# Patient Record
Sex: MALE | Race: WHITE | NOT HISPANIC OR LATINO | Employment: FULL TIME | URBAN - METROPOLITAN AREA
[De-identification: names, ages, dates, MRNs, and addresses within clinical notes are randomized per-mention and may not be internally consistent; named-entity substitution may affect disease eponyms.]

---

## 2020-11-13 ENCOUNTER — OFFICE VISIT (OUTPATIENT)
Dept: GASTROENTEROLOGY | Facility: CLINIC | Age: 55
End: 2020-11-13
Payer: OTHER GOVERNMENT

## 2020-11-13 ENCOUNTER — TELEPHONE (OUTPATIENT)
Dept: GASTROENTEROLOGY | Facility: CLINIC | Age: 55
End: 2020-11-13

## 2020-11-13 VITALS
HEART RATE: 76 BPM | WEIGHT: 187.81 LBS | OXYGEN SATURATION: 98 % | DIASTOLIC BLOOD PRESSURE: 82 MMHG | HEIGHT: 66 IN | SYSTOLIC BLOOD PRESSURE: 120 MMHG | BODY MASS INDEX: 30.18 KG/M2

## 2020-11-13 DIAGNOSIS — R19.5 POSITIVE FIT (FECAL IMMUNOCHEMICAL TEST): Primary | ICD-10-CM

## 2020-11-13 PROCEDURE — 99999 PR PBB SHADOW E&M-NEW PATIENT-LVL III: CPT | Mod: PBBFAC,,, | Performed by: NURSE PRACTITIONER

## 2020-11-13 PROCEDURE — 99203 OFFICE O/P NEW LOW 30 MIN: CPT | Mod: PBBFAC | Performed by: NURSE PRACTITIONER

## 2020-11-13 PROCEDURE — 99999 PR PBB SHADOW E&M-NEW PATIENT-LVL III: ICD-10-PCS | Mod: PBBFAC,,, | Performed by: NURSE PRACTITIONER

## 2020-11-13 PROCEDURE — 99203 OFFICE O/P NEW LOW 30 MIN: CPT | Mod: S$PBB,,, | Performed by: NURSE PRACTITIONER

## 2020-11-13 PROCEDURE — 99203 PR OFFICE/OUTPT VISIT, NEW, LEVL III, 30-44 MIN: ICD-10-PCS | Mod: S$PBB,,, | Performed by: NURSE PRACTITIONER

## 2020-11-13 RX ORDER — POLYETHYLENE GLYCOL 3350, SODIUM SULFATE ANHYDROUS, SODIUM BICARBONATE, SODIUM CHLORIDE, POTASSIUM CHLORIDE 236; 22.74; 6.74; 5.86; 2.97 G/4L; G/4L; G/4L; G/4L; G/4L
4 POWDER, FOR SOLUTION ORAL ONCE
Qty: 4000 ML | Refills: 0 | Status: SHIPPED | OUTPATIENT
Start: 2020-11-13 | End: 2020-11-13

## 2020-11-13 NOTE — TELEPHONE ENCOUNTER
"ENDO screening    1. Have you been admitted for cardiac, kidney or pulmonary causes to the hospital in the past 3 months? no   If yes, schedule an appointment with PCP before scheduling endoscopic procedure.     2. Have you had a stent placed in the last 12 months? no   If yes, for a screening visit, cancel and message the ordering provider.  The patient will need a new order when the time is appropriate.     3. Have you had a stroke or heart attack in the past 6 months? no   If yes, cancel and refer patient to ordering provider for clearance, also message ordering provider to inform.     4. Have you had any chest pain in the past 3 months? no   If yes, Have you been evaluated by your PCP and/or cardiologist and it was determined to not be heart related? not applicable   If No, Pt needs to be seen by PCP or Cardiologist .  Pt can be scheduled once clearance obtained by either of those providers.     5. Do you take prescription weight loss medications?  no   If yes, must stop for 2 weeks prior to procedure.     6. Have you been diagnosed with diverticulitis within the past 3 months? no   If yes, must have been seen by GI within the last 3 months, if not schedule with GI LUCINDA.    If pt has been seen by GI, schedule procedure 8-12 weeks post antibiotic treatment.     7. Are you on Dialysis? no  If yes, schedule procedure for the day AFTER dialysis.  Appt time should be 9am or later, patient arrival time is 2 hours prior.  Nulytely or miralax prep for all patients with kidney disease.     8. Are you diabetic?  no   If yes, schedule morning appt. Advise pt to hold all diabetic meds day of procedure.     9. If pt is older than 80 years of age and HAS NOT been seen by GI or PCP within the last 6 months, needs appt with GI LUCINDA.   If pt has been seen by the GI provider or PCP within the past 6  months AND meets criteria, schedule procedure AND send message to the endoscopist.     10. Is patient on a "high risk" medication " (blood thinner/antiplatelet agent)?  no   If yes, has cardiac clearance been obtained within the last 60 days? N/A   If no, a new clearance needs to be obtained.     Final Questions:    1.I have reviewed the last colonoscopy for recommendations regarding next procedure bowel prep.  yes  2. I have reviewed medications and allergies.  yes  3. I have verified the pharmacy information and appropriate prep sent if needed. yes  4. Prep instructions have been mailed or sent to portal per patient request. yes        All schedulers will have ability to reach out to the ordering GI provider to clarify any issues.

## 2020-11-13 NOTE — PROGRESS NOTES
"Clinic Consult:  [facility] Consultation Note    Reason for Consult:  The encounter diagnosis was Positive FIT (fecal immunochemical test).    PCP: Primary Doctor No   No address on file    HPI:  This is a 54 y.o. male here for evaluation of colon cancer screening.   He had colon cancer screening with FIT kit that was +ve.   No previous colonoscopy. No family history of colon cancer. No overt GI bleeding. No abdominal pain, hematochezia, melena, nausea, vomiting, or weight loss.      Review of Systems   Constitutional: Negative for fever, malaise/fatigue and weight loss.   HENT: Negative for sore throat.    Respiratory: Negative for cough and wheezing.    Cardiovascular: Negative for chest pain and palpitations.   Gastrointestinal: Negative for abdominal pain, blood in stool, constipation, diarrhea, heartburn, melena, nausea and vomiting.   Genitourinary: Negative for dysuria and frequency.   Musculoskeletal: Negative for back pain, joint pain, myalgias and neck pain.   Skin: Negative for itching and rash.   Neurological: Negative for dizziness, speech change, seizures, loss of consciousness and headaches.   Psychiatric/Behavioral: Negative for depression and substance abuse. The patient is not nervous/anxious.        Medical History:  has no past medical history on file.    Surgical History:  has a past surgical history that includes Thumb arthroscopy and Appendectomy.    Family History: family history includes Diabetes in his paternal grandmother; Heart disease in his mother; Hyperlipidemia in his mother..     Social History:      Allergies: Reviewed    Home Medications:   Current Outpatient Medications on File Prior to Visit   Medication Sig Dispense Refill    multivitamin capsule Take 1 capsule by mouth once daily.       No current facility-administered medications on file prior to visit.        Physical Exam:  /82   Pulse 76   Ht 5' 6" (1.676 m)   Wt 85.2 kg (187 lb 13.3 oz)   SpO2 98%  "  BMI 30.32 kg/m²   Body mass index is 30.32 kg/m².  Physical Exam   Constitutional: He is oriented to person, place, and time and well-developed, well-nourished, and in no distress. No distress.   HENT:   Head: Normocephalic.   Eyes: Pupils are equal, round, and reactive to light. Conjunctivae are normal.   Cardiovascular: Normal rate, regular rhythm and normal heart sounds.   Pulmonary/Chest: Effort normal and breath sounds normal. No respiratory distress.   Abdominal: Soft. Bowel sounds are normal. He exhibits no distension. There is no abdominal tenderness.   Neurological: He is alert and oriented to person, place, and time. No cranial nerve deficit.   Skin: Skin is warm and dry. No rash noted.   Psychiatric: Mood and affect normal.       Labs: Pertinent labs reviewed.    Assessment:  1. Positive FIT (fecal immunochemical test)        Recommendations:   Needs colonoscopy for +ve FIT    Positive FIT (fecal immunochemical test)  -     Case request GI: COLONOSCOPY  -     polyethylene glycol (GOLYTELY,NULYTELY) 236-22.74-6.74 -5.86 gram suspension; Take 4,000 mLs (4 L total) by mouth once. for 1 dose  Dispense: 4000 mL; Refill: 0  -     COVID-19 Routine Screening; Future; Expected date: 11/13/2020    Follow up to be determined after results/ procedure(s).    Thank you so much for allowing me to participate in the care of JACK Funk

## 2020-12-09 ENCOUNTER — TELEPHONE (OUTPATIENT)
Dept: GASTROENTEROLOGY | Facility: CLINIC | Age: 55
End: 2020-12-09

## 2020-12-09 NOTE — TELEPHONE ENCOUNTER
Attempted to contact patient to reschedule appointment for Pre Procedural testing. No answer, left message to return call.

## 2020-12-09 NOTE — TELEPHONE ENCOUNTER
----- Message from Jes Mccarthy LPN sent at 12/9/2020 12:54 PM CST -----  Regarding: pre-procedural testing  Good afternoon,    Effective 12/15, the [facility] will no longer be performing pre-procedural covid swabs Monday-Saturday. This patient is scheduled for 1/5. Please contact the patient and re-schedule at another location.     Thank you,  Jes

## 2020-12-15 ENCOUNTER — TELEPHONE (OUTPATIENT)
Dept: GASTROENTEROLOGY | Facility: CLINIC | Age: 55
End: 2020-12-15

## 2020-12-15 NOTE — TELEPHONE ENCOUNTER
Attempted to contact patient to notify him that the location of his COVID test, had to be changed. I have placed in the mail the new location with the date and time of his COVID test.

## 2021-01-13 ENCOUNTER — TELEPHONE (OUTPATIENT)
Dept: PREADMISSION TESTING | Facility: HOSPITAL | Age: 56
End: 2021-01-13

## 2021-01-14 ENCOUNTER — TELEPHONE (OUTPATIENT)
Dept: ENDOSCOPY | Facility: HOSPITAL | Age: 56
End: 2021-01-14

## 2021-01-19 ENCOUNTER — LAB VISIT (OUTPATIENT)
Dept: OTOLARYNGOLOGY | Facility: CLINIC | Age: 56
End: 2021-01-19
Payer: OTHER GOVERNMENT

## 2021-01-19 DIAGNOSIS — R19.5 POSITIVE FIT (FECAL IMMUNOCHEMICAL TEST): ICD-10-CM

## 2021-01-19 PROCEDURE — U0003 INFECTIOUS AGENT DETECTION BY NUCLEIC ACID (DNA OR RNA); SEVERE ACUTE RESPIRATORY SYNDROME CORONAVIRUS 2 (SARS-COV-2) (CORONAVIRUS DISEASE [COVID-19]), AMPLIFIED PROBE TECHNIQUE, MAKING USE OF HIGH THROUGHPUT TECHNOLOGIES AS DESCRIBED BY CMS-2020-01-R: HCPCS

## 2021-01-20 ENCOUNTER — ANESTHESIA EVENT (OUTPATIENT)
Dept: ENDOSCOPY | Facility: HOSPITAL | Age: 56
End: 2021-01-20
Payer: OTHER GOVERNMENT

## 2021-01-20 LAB — SARS-COV-2 RNA RESP QL NAA+PROBE: NOT DETECTED

## 2021-01-21 PROBLEM — R19.5 POSITIVE FIT (FECAL IMMUNOCHEMICAL TEST): Status: ACTIVE | Noted: 2021-01-21

## 2021-01-22 ENCOUNTER — HOSPITAL ENCOUNTER (OUTPATIENT)
Facility: HOSPITAL | Age: 56
Discharge: HOME | End: 2021-01-22
Attending: INTERNAL MEDICINE | Admitting: INTERNAL MEDICINE
Payer: OTHER GOVERNMENT

## 2021-01-22 ENCOUNTER — ANESTHESIA (OUTPATIENT)
Dept: ENDOSCOPY | Facility: HOSPITAL | Age: 56
End: 2021-01-22
Payer: OTHER GOVERNMENT

## 2021-01-22 VITALS
BODY MASS INDEX: 28.77 KG/M2 | WEIGHT: 179 LBS | DIASTOLIC BLOOD PRESSURE: 76 MMHG | OXYGEN SATURATION: 97 % | SYSTOLIC BLOOD PRESSURE: 118 MMHG | RESPIRATION RATE: 19 BRPM | TEMPERATURE: 98 F | HEART RATE: 65 BPM | HEIGHT: 66 IN

## 2021-01-22 DIAGNOSIS — R19.5 POSITIVE FIT (FECAL IMMUNOCHEMICAL TEST): Primary | ICD-10-CM

## 2021-01-22 PROCEDURE — 27201012 HC FORCEPS, HOT/COLD, DISP: Performed by: INTERNAL MEDICINE

## 2021-01-22 PROCEDURE — 45380 PR COLONOSCOPY,BIOPSY: ICD-10-PCS | Mod: 59,,, | Performed by: INTERNAL MEDICINE

## 2021-01-22 PROCEDURE — 88305 TISSUE EXAM BY PATHOLOGIST: ICD-10-PCS | Mod: 26,,, | Performed by: PATHOLOGY

## 2021-01-22 PROCEDURE — 88305 TISSUE EXAM BY PATHOLOGIST: CPT | Mod: 26,,, | Performed by: PATHOLOGY

## 2021-01-22 PROCEDURE — 45385 COLONOSCOPY W/LESION REMOVAL: CPT | Performed by: INTERNAL MEDICINE

## 2021-01-22 PROCEDURE — 00811 ANES LWR INTST NDSC NOS: CPT | Performed by: INTERNAL MEDICINE

## 2021-01-22 PROCEDURE — 45380 COLONOSCOPY AND BIOPSY: CPT | Mod: 59,,, | Performed by: INTERNAL MEDICINE

## 2021-01-22 PROCEDURE — 45385 COLONOSCOPY W/LESION REMOVAL: CPT | Mod: ,,, | Performed by: INTERNAL MEDICINE

## 2021-01-22 PROCEDURE — 45385 PR COLONOSCOPY,REMV LESN,SNARE: ICD-10-PCS | Mod: ,,, | Performed by: INTERNAL MEDICINE

## 2021-01-22 PROCEDURE — 63600175 PHARM REV CODE 636 W HCPCS: Performed by: NURSE ANESTHETIST, CERTIFIED REGISTERED

## 2021-01-22 PROCEDURE — 25000003 PHARM REV CODE 250: Performed by: NURSE ANESTHETIST, CERTIFIED REGISTERED

## 2021-01-22 PROCEDURE — D9220A PRA ANESTHESIA: ICD-10-PCS | Mod: ,,, | Performed by: ANESTHESIOLOGY

## 2021-01-22 PROCEDURE — 27201089 HC SNARE, DISP (ANY): Performed by: INTERNAL MEDICINE

## 2021-01-22 PROCEDURE — 45380 COLONOSCOPY AND BIOPSY: CPT | Performed by: INTERNAL MEDICINE

## 2021-01-22 PROCEDURE — 37000008 HC ANESTHESIA 1ST 15 MINUTES: Performed by: INTERNAL MEDICINE

## 2021-01-22 PROCEDURE — D9220A PRA ANESTHESIA: Mod: ,,, | Performed by: ANESTHESIOLOGY

## 2021-01-22 PROCEDURE — 88305 TISSUE EXAM BY PATHOLOGIST: CPT | Performed by: PATHOLOGY

## 2021-01-22 PROCEDURE — 37000009 HC ANESTHESIA EA ADD 15 MINS: Performed by: INTERNAL MEDICINE

## 2021-01-22 PROCEDURE — 63600175 PHARM REV CODE 636 W HCPCS: Performed by: INTERNAL MEDICINE

## 2021-01-22 RX ORDER — LIDOCAINE HYDROCHLORIDE 20 MG/ML
INJECTION INTRAVENOUS
Status: DISCONTINUED | OUTPATIENT
Start: 2021-01-22 | End: 2021-01-22

## 2021-01-22 RX ORDER — SODIUM CHLORIDE, SODIUM LACTATE, POTASSIUM CHLORIDE, CALCIUM CHLORIDE 600; 310; 30; 20 MG/100ML; MG/100ML; MG/100ML; MG/100ML
INJECTION, SOLUTION INTRAVENOUS CONTINUOUS
Status: DISCONTINUED | OUTPATIENT
Start: 2021-01-22 | End: 2021-01-22 | Stop reason: HOSPADM

## 2021-01-22 RX ORDER — PROPOFOL 10 MG/ML
VIAL (ML) INTRAVENOUS
Status: DISCONTINUED | OUTPATIENT
Start: 2021-01-22 | End: 2021-01-22

## 2021-01-22 RX ORDER — SODIUM CHLORIDE 0.9 % (FLUSH) 0.9 %
10 SYRINGE (ML) INJECTION
Status: DISCONTINUED | OUTPATIENT
Start: 2021-01-22 | End: 2021-01-22 | Stop reason: HOSPADM

## 2021-01-22 RX ADMIN — PROPOFOL 40 MG: 10 INJECTION, EMULSION INTRAVENOUS at 10:01

## 2021-01-22 RX ADMIN — PROPOFOL 30 MG: 10 INJECTION, EMULSION INTRAVENOUS at 10:01

## 2021-01-22 RX ADMIN — Medication 80 MG: at 10:01

## 2021-01-22 RX ADMIN — PROPOFOL 90 MG: 10 INJECTION, EMULSION INTRAVENOUS at 10:01

## 2021-01-22 RX ADMIN — SODIUM CHLORIDE, SODIUM LACTATE, POTASSIUM CHLORIDE, AND CALCIUM CHLORIDE: 600; 310; 30; 20 INJECTION, SOLUTION INTRAVENOUS at 10:01

## 2021-01-29 LAB
COMMENT: NORMAL
FINAL PATHOLOGIC DIAGNOSIS: NORMAL
GROSS: NORMAL
Lab: NORMAL

## 2021-02-05 ENCOUNTER — PATIENT MESSAGE (OUTPATIENT)
Dept: GASTROENTEROLOGY | Facility: CLINIC | Age: 56
End: 2021-02-05

## 2021-04-23 ENCOUNTER — PATIENT MESSAGE (OUTPATIENT)
Dept: GASTROENTEROLOGY | Facility: CLINIC | Age: 56
End: 2021-04-23

## 2021-04-27 DIAGNOSIS — K64.8 INTERNAL HEMORRHOID: Primary | ICD-10-CM

## 2021-04-28 ENCOUNTER — PATIENT MESSAGE (OUTPATIENT)
Dept: RESEARCH | Facility: HOSPITAL | Age: 56
End: 2021-04-28

## 2021-04-29 ENCOUNTER — TELEPHONE (OUTPATIENT)
Dept: SURGERY | Facility: CLINIC | Age: 56
End: 2021-04-29

## 2021-06-07 ENCOUNTER — PATIENT MESSAGE (OUTPATIENT)
Dept: SURGERY | Facility: CLINIC | Age: 56
End: 2021-06-07

## 2021-06-29 ENCOUNTER — PATIENT MESSAGE (OUTPATIENT)
Dept: SURGERY | Facility: CLINIC | Age: 56
End: 2021-06-29

## 2021-07-14 ENCOUNTER — OFFICE VISIT (OUTPATIENT)
Dept: SURGERY | Facility: CLINIC | Age: 56
End: 2021-07-14
Payer: OTHER GOVERNMENT

## 2021-07-14 VITALS
SYSTOLIC BLOOD PRESSURE: 125 MMHG | HEART RATE: 63 BPM | BODY MASS INDEX: 30.07 KG/M2 | WEIGHT: 186.31 LBS | TEMPERATURE: 98 F | DIASTOLIC BLOOD PRESSURE: 79 MMHG

## 2021-07-14 DIAGNOSIS — K64.8 INTERNAL HEMORRHOID: ICD-10-CM

## 2021-07-14 PROCEDURE — 99213 OFFICE O/P EST LOW 20 MIN: CPT | Mod: PBBFAC | Performed by: COLON & RECTAL SURGERY

## 2021-07-14 PROCEDURE — 99999 PR PBB SHADOW E&M-EST. PATIENT-LVL III: ICD-10-PCS | Mod: PBBFAC,,, | Performed by: COLON & RECTAL SURGERY

## 2021-07-14 PROCEDURE — 99203 PR OFFICE/OUTPT VISIT, NEW, LEVL III, 30-44 MIN: ICD-10-PCS | Mod: S$PBB,,, | Performed by: COLON & RECTAL SURGERY

## 2021-07-14 PROCEDURE — 99203 OFFICE O/P NEW LOW 30 MIN: CPT | Mod: S$PBB,,, | Performed by: COLON & RECTAL SURGERY

## 2021-07-14 PROCEDURE — 99999 PR PBB SHADOW E&M-EST. PATIENT-LVL III: CPT | Mod: PBBFAC,,, | Performed by: COLON & RECTAL SURGERY

## 2022-12-09 ENCOUNTER — OFFICE VISIT (OUTPATIENT)
Dept: SURGERY | Facility: CLINIC | Age: 57
End: 2022-12-09
Payer: OTHER GOVERNMENT

## 2022-12-09 VITALS
SYSTOLIC BLOOD PRESSURE: 119 MMHG | DIASTOLIC BLOOD PRESSURE: 75 MMHG | HEART RATE: 73 BPM | WEIGHT: 191.81 LBS | TEMPERATURE: 98 F | BODY MASS INDEX: 30.96 KG/M2

## 2022-12-09 DIAGNOSIS — K64.4 INTERNAL AND EXTERNAL BLEEDING HEMORRHOIDS: Primary | ICD-10-CM

## 2022-12-09 DIAGNOSIS — K64.8 INTERNAL AND EXTERNAL BLEEDING HEMORRHOIDS: Primary | ICD-10-CM

## 2022-12-09 PROCEDURE — 99999 PR PBB SHADOW E&M-EST. PATIENT-LVL IV: CPT | Mod: PBBFAC,,, | Performed by: SURGERY

## 2022-12-09 PROCEDURE — 99204 PR OFFICE/OUTPT VISIT, NEW, LEVL IV, 45-59 MIN: ICD-10-PCS | Mod: S$PBB,,, | Performed by: SURGERY

## 2022-12-09 PROCEDURE — 99999 PR PBB SHADOW E&M-EST. PATIENT-LVL IV: ICD-10-PCS | Mod: PBBFAC,,, | Performed by: SURGERY

## 2022-12-09 PROCEDURE — 99214 OFFICE O/P EST MOD 30 MIN: CPT | Mod: PBBFAC | Performed by: SURGERY

## 2022-12-09 PROCEDURE — 99204 OFFICE O/P NEW MOD 45 MIN: CPT | Mod: S$PBB,,, | Performed by: SURGERY

## 2022-12-09 RX ORDER — HYDROCORTISONE ACETATE 25 MG/1
25 SUPPOSITORY RECTAL 2 TIMES DAILY
Qty: 20 SUPPOSITORY | Refills: 0 | Status: SHIPPED | OUTPATIENT
Start: 2022-12-09 | End: 2022-12-19

## 2022-12-09 RX ORDER — LIDOCAINE HYDROCHLORIDE 10 MG/ML
1 INJECTION, SOLUTION EPIDURAL; INFILTRATION; INTRACAUDAL; PERINEURAL ONCE
Status: DISCONTINUED | OUTPATIENT
Start: 2022-12-09 | End: 2023-02-16 | Stop reason: HOSPADM

## 2022-12-09 NOTE — PROGRESS NOTES
[facility] -   General Surgery History & Physical    SUBJECTIVE:     History of Present Illness:  Patient is a 56 y.o. male presents with rectal bleeding from hemorrhoids. Underwent a colonoscopy and found to have internal hemorrhoids. Initially he was going to discuss having them banded, but they stopped bleeding. He states they are now bleeding quite a bit.      Review of patient's allergies indicates:  No Known Allergies    Current Outpatient Medications   Medication Sig Dispense Refill    multivitamin capsule Take 1 capsule by mouth once daily.      hydrocortisone (ANUSOL-HC) 25 mg suppository Place 1 suppository (25 mg total) rectally 2 (two) times daily. for 10 days 20 suppository 0     Current Facility-Administered Medications   Medication Dose Route Frequency Provider Last Rate Last Admin    LIDOcaine (PF) 10 mg/ml (1%) injection 10 mg  1 mL Intradermal Once Mansi Kapoor DO           Past Medical History:   Diagnosis Date    Digestive disorder      Past Surgical History:   Procedure Laterality Date    APPENDECTOMY      COLONOSCOPY N/A 1/22/2021    Procedure: COLONOSCOPY;  Surgeon: Kari Rivera MD;  Location: [facility];  Service: Endoscopy;  Laterality: N/A;    THUMB ARTHROSCOPY       Family History   Problem Relation Age of Onset    Heart disease Mother     Hyperlipidemia Mother     Diabetes Paternal Grandmother      Social History     Tobacco Use    Smoking status: Never    Smokeless tobacco: Never        Review of Systems:  Review of Systems   Constitutional:  Negative for fever.   Gastrointestinal:  Positive for anal bleeding.     OBJECTIVE:     Vital Signs (Most Recent)  Temp: 98.2 °F (36.8 °C) (12/09/22 1140)  Pulse: 73 (12/09/22 1140)  BP: 119/75 (12/09/22 1140)     87 kg (191 lb 12.8 oz)     Physical Exam:  Physical Exam  Vitals and nursing note reviewed.   Constitutional:       General: He is not in acute distress.     Appearance: He is not ill-appearing, toxic-appearing  or diaphoretic.   HENT:      Head: Normocephalic.      Nose: Nose normal.      Mouth/Throat:      Mouth: Mucous membranes are moist.   Eyes:      General: No scleral icterus.        Right eye: No discharge.         Left eye: No discharge.      Extraocular Movements: Extraocular movements intact.   Cardiovascular:      Rate and Rhythm: Normal rate and regular rhythm.   Pulmonary:      Effort: No respiratory distress.      Breath sounds: No stridor.   Abdominal:      General: There is no distension.      Palpations: Abdomen is soft.      Tenderness: There is no abdominal tenderness.   Genitourinary:     Comments: Moderately sized external hemorrhoid with thickened mucosa consistent with chronic irritation. Smaller external and visible internal hemorrhoids.  Musculoskeletal:      Cervical back: No rigidity.   Skin:     General: Skin is warm and dry.      Coloration: Skin is not jaundiced or pale.   Neurological:      Mental Status: He is alert and oriented to person, place, and time.   Psychiatric:         Mood and Affect: Mood normal.         Behavior: Behavior normal.     Dr. Jimenez's office note reviewed.    Colonoscopy:  Findings:        A 2 to 3 mm polyp was found in the transverse colon. The polyp was        sessile. The polyp was removed with a cold biopsy forceps. Resection        and retrieval were complete.        A 3 to 4 mm polyp was found in the transverse colon. The polyp was        sessile. The polyp was removed with a cold snare. Resection and        retrieval were complete.        Internal hemorrhoids were found during retroflexion. The hemorrhoids        were moderate.        The exam was otherwise without abnormality on direct and        retroflexion views.        Hemorrhoids were found on perianal exam.   Impression:           - One 2 to 3 mm polyp in the transverse colon,                         removed with a cold biopsy forceps. Resected and                         retrieved.                          - One 3 to 4 mm polyp in the transverse colon,                         removed with a cold snare. Resected and retrieved.                         - Internal hemorrhoids.                         - The examination was otherwise normal on direct                         and retroflexion views.                         - Hemorrhoids found on perianal exam.   Recommendation:       - Discharge patient to home (via wheelchair).                         - Resume previous diet.                         - Continue present medications.                         - Await pathology results.                         - Repeat colonoscopy in 7 years for surveillance.       Final Pathologic Diagnosis COLON, TRANSVERSE, BIOPSY:   - Sessile serrated adenomas with low-grade dysplasia (x2)   - No evidence of high-grade dysplasia or malignancy (multiple deeper levels   examined)        ASSESSMENT/PLAN:     Augustine was seen today for consult.    Diagnoses and all orders for this visit:    Internal and external bleeding hemorrhoids  -     Full code; Standing  -     Place in Outpatient; Standing  -     Case Request Operating Room: HEMORRHOIDECTOMY  -     Insert peripheral IV; Standing  -     Diet NPO; Standing  -     Place sequential compression device; Standing  -     Comprehensive metabolic panel; Future  -     CBC auto differential; Future  -     Full code  -     Insert peripheral IV    Other orders  -     LIDOcaine (PF) 10 mg/ml (1%) injection 10 mg  -     ceFAZolin (ANCEF) 2 g in dextrose 5 % 50 mL IVPB  -     hydrocortisone (ANUSOL-HC) 25 mg suppository; Place 1 suppository (25 mg total) rectally 2 (two) times daily. for 10 days         Will plan for hemorrhoidectomy at the patient's earliest convenience. Will prescribe anusol suppositories. We discussed the importance of avoiding constipation and straining.     After explaining the risks (including bleeding, infection, impaired wound healing, scarring, damage to other organs, vascular  complications, cardiopulmonary complications, and death), benefits, and alternatives, patient verbalized understanding and would like to proceed with surgery. All questions were answered to their satisfaction.      Patient expressed understanding and is in agreement.      Mansi aKpoor,   General Surgery  [facility]

## 2023-01-17 ENCOUNTER — PATIENT MESSAGE (OUTPATIENT)
Dept: SURGERY | Facility: HOSPITAL | Age: 58
End: 2023-01-17
Payer: OTHER GOVERNMENT

## 2023-01-17 RX ORDER — HYDROCORTISONE ACETATE 25 MG/1
25 SUPPOSITORY RECTAL 2 TIMES DAILY
Qty: 20 SUPPOSITORY | Refills: 0 | Status: SHIPPED | OUTPATIENT
Start: 2023-01-17 | End: 2023-01-24

## 2023-01-18 ENCOUNTER — TELEPHONE (OUTPATIENT)
Dept: SURGERY | Facility: CLINIC | Age: 58
End: 2023-01-18
Payer: OTHER GOVERNMENT

## 2023-01-18 NOTE — TELEPHONE ENCOUNTER
Spoke with Madelaine, patient's spouse, in regards to a call back. She gave [facility] fax number and an ATTN as well to proceed with surgery. Notified her that the department does not handle authorization/[facility] insurance specifically and that it would be with another department. Madelaine will call [facility] for further instructions. She verbalized understanding.

## 2023-01-18 NOTE — TELEPHONE ENCOUNTER
----- Message from Katie Gabriel sent at 1/18/2023 12:34 PM CST -----  Contact: Madelaine Burkett stated the fax number for the [facility] is [phone]. It needs to have attention Dr. Rod, which is his PCP. Please call her back with any issues or any questions at [phone].

## 2023-01-18 NOTE — TELEPHONE ENCOUNTER
Called Madelaine again. Gave her authorization department phone number at [facility]: Jaquan Orozco [phone]. She verbalized understanding.

## 2023-01-19 ENCOUNTER — TELEPHONE (OUTPATIENT)
Dept: SURGERY | Facility: CLINIC | Age: 58
End: 2023-01-19
Payer: OTHER GOVERNMENT

## 2023-01-19 NOTE — TELEPHONE ENCOUNTER
Returned patient's spouse callback. Madelaine would like a [facility] form to be filled out for patient in regards to him having surgery. She e-mailed form and would like it to be completed as soon as possible. She wants form to state patient is scheduled to have surgery. She states she needs this filled out for patient or he will be unable to have surgery. Notified her we will review form with Dr. Kapoor as some forms can only be filled out after surgery. She verbalized understanding.

## 2023-01-24 RX ORDER — HYDROCORTISONE ACETATE 25 MG/1
25 SUPPOSITORY RECTAL 2 TIMES DAILY
Qty: 20 SUPPOSITORY | Refills: 0 | OUTPATIENT
Start: 2023-01-24 | End: 2023-02-03

## 2023-02-03 ENCOUNTER — LAB VISIT (OUTPATIENT)
Dept: LAB | Facility: HOSPITAL | Age: 58
End: 2023-02-03
Attending: SURGERY
Payer: OTHER GOVERNMENT

## 2023-02-03 DIAGNOSIS — K64.8 INTERNAL AND EXTERNAL BLEEDING HEMORRHOIDS: ICD-10-CM

## 2023-02-03 DIAGNOSIS — K64.4 INTERNAL AND EXTERNAL BLEEDING HEMORRHOIDS: ICD-10-CM

## 2023-02-03 LAB
ALBUMIN SERPL BCP-MCNC: 4.1 G/DL (ref 3.5–5.2)
ALP SERPL-CCNC: 62 U/L (ref 55–135)
ALT SERPL W/O P-5'-P-CCNC: 42 U/L (ref 10–44)
ANION GAP SERPL CALC-SCNC: 8 MMOL/L (ref 8–16)
AST SERPL-CCNC: 24 U/L (ref 10–40)
BASOPHILS # BLD AUTO: 0.05 K/UL (ref 0–0.2)
BASOPHILS NFR BLD: 0.8 % (ref 0–1.9)
BILIRUB SERPL-MCNC: 0.6 MG/DL (ref 0.1–1)
BUN SERPL-MCNC: 17 MG/DL (ref 6–20)
CALCIUM SERPL-MCNC: 9.7 MG/DL (ref 8.7–10.5)
CHLORIDE SERPL-SCNC: 103 MMOL/L (ref 95–110)
CO2 SERPL-SCNC: 28 MMOL/L (ref 23–29)
CREAT SERPL-MCNC: 1.2 MG/DL (ref 0.5–1.4)
DIFFERENTIAL METHOD: ABNORMAL
EOSINOPHIL # BLD AUTO: 0.2 K/UL (ref 0–0.5)
EOSINOPHIL NFR BLD: 3.9 % (ref 0–8)
ERYTHROCYTE [DISTWIDTH] IN BLOOD BY AUTOMATED COUNT: 12 % (ref 11.5–14.5)
EST. GFR  (NO RACE VARIABLE): >60 ML/MIN/1.73 M^2
GLUCOSE SERPL-MCNC: 100 MG/DL (ref 70–110)
HCT VFR BLD AUTO: 42.4 % (ref 40–54)
HGB BLD-MCNC: 14.6 G/DL (ref 14–18)
IMM GRANULOCYTES # BLD AUTO: 0.04 K/UL (ref 0–0.04)
IMM GRANULOCYTES NFR BLD AUTO: 0.6 % (ref 0–0.5)
LYMPHOCYTES # BLD AUTO: 1.3 K/UL (ref 1–4.8)
LYMPHOCYTES NFR BLD: 20.8 % (ref 18–48)
MCH RBC QN AUTO: 30.5 PG (ref 27–31)
MCHC RBC AUTO-ENTMCNC: 34.4 G/DL (ref 32–36)
MCV RBC AUTO: 89 FL (ref 82–98)
MONOCYTES # BLD AUTO: 0.7 K/UL (ref 0.3–1)
MONOCYTES NFR BLD: 10.8 % (ref 4–15)
NEUTROPHILS # BLD AUTO: 3.9 K/UL (ref 1.8–7.7)
NEUTROPHILS NFR BLD: 63.1 % (ref 38–73)
NRBC BLD-RTO: 0 /100 WBC
PLATELET # BLD AUTO: 238 K/UL (ref 150–450)
PMV BLD AUTO: 9.9 FL (ref 9.2–12.9)
POTASSIUM SERPL-SCNC: 5 MMOL/L (ref 3.5–5.1)
PROT SERPL-MCNC: 7.2 G/DL (ref 6–8.4)
RBC # BLD AUTO: 4.78 M/UL (ref 4.6–6.2)
SODIUM SERPL-SCNC: 139 MMOL/L (ref 136–145)
WBC # BLD AUTO: 6.21 K/UL (ref 3.9–12.7)

## 2023-02-03 PROCEDURE — 36415 COLL VENOUS BLD VENIPUNCTURE: CPT | Performed by: SURGERY

## 2023-02-03 PROCEDURE — 80053 COMPREHEN METABOLIC PANEL: CPT | Performed by: SURGERY

## 2023-02-03 PROCEDURE — 85025 COMPLETE CBC W/AUTO DIFF WBC: CPT | Performed by: SURGERY

## 2023-02-13 ENCOUNTER — TELEPHONE (OUTPATIENT)
Dept: PREADMISSION TESTING | Facility: HOSPITAL | Age: 58
End: 2023-02-13
Payer: OTHER GOVERNMENT

## 2023-02-13 NOTE — TELEPHONE ENCOUNTER
Pre op instructions reviewed with pt wife : Spoke about pre op process and surgery instructions, verbalized understanding.    To confirm, Surgery is scheduled on 2/16/23. We will call you late afternoon the business day prior to surgery with your arrival time.    *Please report to the [facility] Lobby (1st Floor) located off of [facility] (2nd Entrance/Building on the left, in front of the flag pole).  Address: [address] [place] [address] [place] [address]      INSTRUCTIONS IMPORTANT!!!  Do Not Eat, Drink, or Smoke after 12 midnight unless instructed otherwise by your Surgeon. OK to brush teeth, no gum, candy or mints!      *Take Only these medications with a small sip of water Morning of Surgery:  none    ____  HOLD all vitamins, herbal supplements, aspirin products & NSAIDS 7 days prior to surgery, as these can thin the blood.  ____  NO Acrylic/fake nails or nail polish worn day of surgery (specifically hand/arm & foot surgeries).  ____  NO powder, lotions, deodorants, oils or cream on body.  ____  Remove all jewelry & piercings prior to surgery.  ____  Remove Dentures, Hearing Aids & Contact Lens prior to surgery.  ____  Bring photo ID and insurance information to hospital (Leave Valuables at Home).  ____  If going home the same day, arrange for a ride home. You will not be able to drive for 24 hrs if Anesthesia was used.   ____  Females (ages 11-60): may need to give a urine sample the morning of surgery; please see Pre op Nurse prior to using the restroom.  ____  Males: Stop ED medications (Viagra, Cialis) 24 hrs prior to surgery.  ____  Wear clean, loose fitting clothing to allow for dressings/ bandages.            Diabetic Patients: If you take diabetic medication, do NOT take morning of surgery unless instructed by Doctor. Metformin to be stopped 24 hrs prior to surgery time. DO NOT take long-acting insulin the evening before surgery. Blood sugars will be checked in pre-op by Nurse.    Bathing  Instructions:    -Shower with anti-bacterial Soap (Hibiclens or Dial) the night before surgery and the morning of.   -Do not use Hibiclens on your face or genitals.   -Apply clean clothes after shower.  -Do not shave your face or body 2 days prior to surgery unless instructed otherwise by your Surgeon.  -Do not shave pubic hair 7 days prior to surgery (gyn pt's).    [facility] Visitor/Ride Policy:  Only 2 adults allowed (over the age of 18) to accompany you to the Hospital. You Must have a ride home from a responsible adult that you know and trust. Medical Transport, Uber or Lyft can only be used if patient has a responsible adult to accompany them during ride home.    Discharge Instructions: You will receive Post-op/Discharge instructions by your Discharge Nurse prior to going home. Please call your Surgeon's office with any post-surgery questions/concerns @ [phone].    *If you are running late or have questions the morning of surgery, please call the Surgery Dept @ [phone]  *Insurance/ Financial Questions, please call [phone]    Thank you,  -[facility] Pre Admit Testing Nurse  [phone] or [phone]  M-F 7:30 am-4 pm (Closed Major Holidays)

## 2023-02-15 ENCOUNTER — ANESTHESIA EVENT (OUTPATIENT)
Dept: SURGERY | Facility: HOSPITAL | Age: 58
End: 2023-02-15
Payer: OTHER GOVERNMENT

## 2023-02-15 ENCOUNTER — TELEPHONE (OUTPATIENT)
Dept: PREADMISSION TESTING | Facility: HOSPITAL | Age: 58
End: 2023-02-15
Payer: OTHER GOVERNMENT

## 2023-02-15 NOTE — ANESTHESIA PREPROCEDURE EVALUATION
02/15/2023  Augustine Robledo is a 57 y.o., male     Patient Active Problem List   Diagnosis    Positive FIT (fecal immunochemical test)     Past Medical History:   Diagnosis Date    Digestive disorder      Past Surgical History:   Procedure Laterality Date    APPENDECTOMY      COLONOSCOPY N/A 1/22/2021    Procedure: COLONOSCOPY;  Surgeon: Kari Rivera MD;  Location: [facility];  Service: Endoscopy;  Laterality: N/A;    THUMB ARTHROSCOPY           Chemistry        Component Value Date/Time     02/03/2023 0953    K 5.0 02/03/2023 0953     02/03/2023 0953    CO2 28 02/03/2023 0953    BUN 17 02/03/2023 0953    CREATININE 1.2 02/03/2023 0953     02/03/2023 0953        Component Value Date/Time    CALCIUM 9.7 02/03/2023 0953    ALKPHOS 62 02/03/2023 0953    AST 24 02/03/2023 0953    ALT 42 02/03/2023 0953    BILITOT 0.6 02/03/2023 0953        Lab Results   Component Value Date    WBC 6.21 02/03/2023    HGB 14.6 02/03/2023    HCT 42.4 02/03/2023    MCV 89 02/03/2023     02/03/2023           Pre-op Assessment    I have reviewed the Patient Summary Reports.    I have reviewed the NPO Status.   I have reviewed the Medications.     Review of Systems  Anesthesia Hx:  History of prior surgery of interest to airway management or planning:   Social:  Non-Smoker    Hematology/Oncology:  Hematology Normal   Oncology Normal    -- Denies Anemia:    Cardiovascular:  Cardiovascular Normal     Pulmonary:   Sleep Apnea    Renal/:  Renal/ Normal     Hepatic/GI:   Bleeding internal hemorrhoids    Musculoskeletal:  Musculoskeletal Normal    Neurological:  Neurology Normal    Endocrine:   BMI 31 Obesity / BMI > 30      Physical Exam  General: Well nourished    Airway:  Mallampati: II   Mouth Opening: Normal  TM Distance: Normal  Neck ROM: Normal ROM    Dental:  Intact        Anesthesia Plan  Type  of Anesthesia, risks & benefits discussed:    Anesthesia Type: Gen ETT  Intra-op Monitoring Plan: Standard ASA Monitors  Post Op Pain Control Plan: multimodal analgesia and IV/PO Opioids PRN  Induction:  IV  Airway Plan: Direct, Post-Induction  Informed Consent: Informed consent signed with the Patient and all parties understand the risks and agree with anesthesia plan.  All questions answered.   ASA Score: 2  Day of Surgery Review of History & Physical: H&P Update referred to the surgeon/provider.    Ready For Surgery From Anesthesia Perspective.     .      Chemistry        Component Value Date/Time     02/03/2023 0953    K 5.0 02/03/2023 0953     02/03/2023 0953    CO2 28 02/03/2023 0953    BUN 17 02/03/2023 0953    CREATININE 1.2 02/03/2023 0953     02/03/2023 0953        Component Value Date/Time    CALCIUM 9.7 02/03/2023 0953    ALKPHOS 62 02/03/2023 0953    AST 24 02/03/2023 0953    ALT 42 02/03/2023 0953    BILITOT 0.6 02/03/2023 0953        Lab Results   Component Value Date    WBC 6.21 02/03/2023    HGB 14.6 02/03/2023    HCT 42.4 02/03/2023    MCV 89 02/03/2023     02/03/2023

## 2023-02-15 NOTE — TELEPHONE ENCOUNTER
Called and spoke with Pt's spouse about the following:     Please arrive to [facility] at 5:30 am on 2/16/23 for your scheduled procedure.  Address: [address] [place] [address] [place] [address] (2nd Building on left, 1st Floor Lobby)  >>>NO eating or drinking after midnight unless instructed otherwise by your Surgeon<<<

## 2023-02-16 ENCOUNTER — ANESTHESIA (OUTPATIENT)
Dept: SURGERY | Facility: HOSPITAL | Age: 58
End: 2023-02-16
Payer: OTHER GOVERNMENT

## 2023-02-16 ENCOUNTER — HOSPITAL ENCOUNTER (OUTPATIENT)
Facility: HOSPITAL | Age: 58
Discharge: HOME | End: 2023-02-16
Attending: SURGERY | Admitting: SURGERY
Payer: OTHER GOVERNMENT

## 2023-02-16 DIAGNOSIS — K64.8 INTERNAL AND EXTERNAL BLEEDING HEMORRHOIDS: ICD-10-CM

## 2023-02-16 DIAGNOSIS — K64.4 INTERNAL AND EXTERNAL BLEEDING HEMORRHOIDS: ICD-10-CM

## 2023-02-16 PROCEDURE — S0030 INJECTION, METRONIDAZOLE: HCPCS | Performed by: SURGERY

## 2023-02-16 PROCEDURE — 37000009 HC ANESTHESIA EA ADD 15 MINS: Performed by: SURGERY

## 2023-02-16 PROCEDURE — 00902 ANES ANORECTAL PX: CPT | Performed by: SURGERY

## 2023-02-16 PROCEDURE — 25000003 PHARM REV CODE 250: Performed by: NURSE ANESTHETIST, CERTIFIED REGISTERED

## 2023-02-16 PROCEDURE — 25000003 PHARM REV CODE 250: Performed by: SURGERY

## 2023-02-16 PROCEDURE — 46945 INT HRHC LIG 1 HROID W/O IMG: CPT | Mod: 51,,, | Performed by: SURGERY

## 2023-02-16 PROCEDURE — 88304 TISSUE EXAM BY PATHOLOGIST: CPT | Mod: 26,,, | Performed by: PATHOLOGY

## 2023-02-16 PROCEDURE — 46255 REMOVE INT/EXT HEM 1 GROUP: CPT | Mod: ,,, | Performed by: SURGERY

## 2023-02-16 PROCEDURE — 71000015 HC POSTOP RECOV 1ST HR: Performed by: SURGERY

## 2023-02-16 PROCEDURE — 27201423 OPTIME MED/SURG SUP & DEVICES STERILE SUPPLY: Performed by: SURGERY

## 2023-02-16 PROCEDURE — 46945 PR HEMORRHOIDECTOMY, INT, LIGATION, W/O IMG, SNGL: ICD-10-PCS | Mod: 51,,, | Performed by: SURGERY

## 2023-02-16 PROCEDURE — C9290 INJ, BUPIVACAINE LIPOSOME: HCPCS | Performed by: SURGERY

## 2023-02-16 PROCEDURE — 63600175 PHARM REV CODE 636 W HCPCS: Performed by: SURGERY

## 2023-02-16 PROCEDURE — 71000033 HC RECOVERY, INTIAL HOUR: Performed by: SURGERY

## 2023-02-16 PROCEDURE — 88304 TISSUE EXAM BY PATHOLOGIST: CPT | Performed by: PATHOLOGY

## 2023-02-16 PROCEDURE — 37000008 HC ANESTHESIA 1ST 15 MINUTES: Performed by: SURGERY

## 2023-02-16 PROCEDURE — 46255 PR HEMORRHOIDECTOMY,INT/EXT,1 COLUMN/GROUP: ICD-10-PCS | Mod: ,,, | Performed by: SURGERY

## 2023-02-16 PROCEDURE — 63600175 PHARM REV CODE 636 W HCPCS: Performed by: NURSE ANESTHETIST, CERTIFIED REGISTERED

## 2023-02-16 PROCEDURE — 36000707: Performed by: SURGERY

## 2023-02-16 PROCEDURE — 36000706: Performed by: SURGERY

## 2023-02-16 PROCEDURE — 88304 PR  SURG PATH,LEVEL III: ICD-10-PCS | Mod: 26,,, | Performed by: PATHOLOGY

## 2023-02-16 RX ORDER — IBUPROFEN 600 MG/1
600 TABLET ORAL EVERY 6 HOURS PRN
Qty: 25 TABLET | Refills: 0 | Status: SHIPPED | OUTPATIENT
Start: 2023-02-16 | End: 2023-02-24 | Stop reason: SDUPTHER

## 2023-02-16 RX ORDER — HYDROMORPHONE HYDROCHLORIDE 2 MG/ML
0.2 INJECTION, SOLUTION INTRAMUSCULAR; INTRAVENOUS; SUBCUTANEOUS EVERY 5 MIN PRN
Status: CANCELLED | OUTPATIENT
Start: 2023-02-16

## 2023-02-16 RX ORDER — DOCUSATE SODIUM 100 MG/1
100 CAPSULE, LIQUID FILLED ORAL 2 TIMES DAILY
Qty: 60 CAPSULE | Refills: 1 | Status: SHIPPED | OUTPATIENT
Start: 2023-02-16

## 2023-02-16 RX ORDER — DIPHENHYDRAMINE HYDROCHLORIDE 50 MG/ML
12.5 INJECTION INTRAMUSCULAR; INTRAVENOUS
Status: CANCELLED | OUTPATIENT
Start: 2023-02-16

## 2023-02-16 RX ORDER — FENTANYL CITRATE 50 UG/ML
INJECTION, SOLUTION INTRAMUSCULAR; INTRAVENOUS
Status: DISCONTINUED | OUTPATIENT
Start: 2023-02-16 | End: 2023-02-16

## 2023-02-16 RX ORDER — ONDANSETRON 2 MG/ML
INJECTION INTRAMUSCULAR; INTRAVENOUS
Status: DISCONTINUED | OUTPATIENT
Start: 2023-02-16 | End: 2023-02-16

## 2023-02-16 RX ORDER — OXYCODONE AND ACETAMINOPHEN 5; 325 MG/1; MG/1
1 TABLET ORAL
Status: CANCELLED | OUTPATIENT
Start: 2023-02-16

## 2023-02-16 RX ORDER — ACETAMINOPHEN 10 MG/ML
INJECTION, SOLUTION INTRAVENOUS
Status: DISCONTINUED | OUTPATIENT
Start: 2023-02-16 | End: 2023-02-16

## 2023-02-16 RX ORDER — METHOCARBAMOL 500 MG/1
1000 TABLET, FILM COATED ORAL 4 TIMES DAILY PRN
Qty: 35 TABLET | Refills: 0 | Status: SHIPPED | OUTPATIENT
Start: 2023-02-16 | End: 2023-02-20 | Stop reason: SDUPTHER

## 2023-02-16 RX ORDER — BUPIVACAINE HYDROCHLORIDE 2.5 MG/ML
INJECTION, SOLUTION EPIDURAL; INFILTRATION; INTRACAUDAL
Status: DISCONTINUED | OUTPATIENT
Start: 2023-02-16 | End: 2023-02-16 | Stop reason: HOSPADM

## 2023-02-16 RX ORDER — MIDAZOLAM HYDROCHLORIDE 1 MG/ML
INJECTION, SOLUTION INTRAMUSCULAR; INTRAVENOUS
Status: DISCONTINUED | OUTPATIENT
Start: 2023-02-16 | End: 2023-02-16

## 2023-02-16 RX ORDER — KETOROLAC TROMETHAMINE 30 MG/ML
15 INJECTION, SOLUTION INTRAMUSCULAR; INTRAVENOUS EVERY 8 HOURS PRN
Status: CANCELLED | OUTPATIENT
Start: 2023-02-16 | End: 2023-02-18

## 2023-02-16 RX ORDER — OXYCODONE AND ACETAMINOPHEN 7.5; 325 MG/1; MG/1
1 TABLET ORAL
Qty: 30 TABLET | Refills: 0 | Status: SHIPPED | OUTPATIENT
Start: 2023-02-16 | End: 2023-02-24 | Stop reason: SDUPTHER

## 2023-02-16 RX ORDER — CEFAZOLIN SODIUM 2 G/50ML
2 SOLUTION INTRAVENOUS
Status: COMPLETED | OUTPATIENT
Start: 2023-02-16 | End: 2023-02-16

## 2023-02-16 RX ORDER — METRONIDAZOLE 500 MG/100ML
500 INJECTION, SOLUTION INTRAVENOUS ONCE
Status: COMPLETED | OUTPATIENT
Start: 2023-02-16 | End: 2023-02-16

## 2023-02-16 RX ORDER — LIDOCAINE HYDROCHLORIDE 10 MG/ML
INJECTION, SOLUTION EPIDURAL; INFILTRATION; INTRACAUDAL; PERINEURAL
Status: DISCONTINUED | OUTPATIENT
Start: 2023-02-16 | End: 2023-02-16

## 2023-02-16 RX ORDER — ONDANSETRON 2 MG/ML
4 INJECTION INTRAMUSCULAR; INTRAVENOUS DAILY PRN
Status: CANCELLED | OUTPATIENT
Start: 2023-02-16

## 2023-02-16 RX ORDER — PROPOFOL 10 MG/ML
VIAL (ML) INTRAVENOUS
Status: DISCONTINUED | OUTPATIENT
Start: 2023-02-16 | End: 2023-02-16

## 2023-02-16 RX ADMIN — METRONIDAZOLE 500 MG: 5 INJECTION, SOLUTION INTRAVENOUS at 07:02

## 2023-02-16 RX ADMIN — MIDAZOLAM 2 MG: 1 INJECTION INTRAMUSCULAR; INTRAVENOUS at 06:02

## 2023-02-16 RX ADMIN — CEFAZOLIN SODIUM 2 G: 2 SOLUTION INTRAVENOUS at 07:02

## 2023-02-16 RX ADMIN — SODIUM CHLORIDE, SODIUM LACTATE, POTASSIUM CHLORIDE, AND CALCIUM CHLORIDE: .6; .31; .03; .02 INJECTION, SOLUTION INTRAVENOUS at 06:02

## 2023-02-16 RX ADMIN — PROPOFOL 150 MG: 10 INJECTION, EMULSION INTRAVENOUS at 07:02

## 2023-02-16 RX ADMIN — ONDANSETRON 4 MG: 2 INJECTION INTRAMUSCULAR; INTRAVENOUS at 07:02

## 2023-02-16 RX ADMIN — ACETAMINOPHEN 1000 MG: 10 INJECTION, SOLUTION INTRAVENOUS at 08:02

## 2023-02-16 RX ADMIN — FENTANYL CITRATE 100 MCG: 50 INJECTION, SOLUTION INTRAMUSCULAR; INTRAVENOUS at 07:02

## 2023-02-16 RX ADMIN — LIDOCAINE HYDROCHLORIDE 50 MG: 10 INJECTION, SOLUTION EPIDURAL; INFILTRATION; INTRACAUDAL; PERINEURAL at 07:02

## 2023-02-16 NOTE — ANESTHESIA PROCEDURE NOTES
Intubation    Date/Time: 2/16/2023 7:04 AM  Performed by: Rosalind Cervantes CRNA  Authorized by: Wallace Gardner II, MD     Intubation:     Induction:  Inhalational - mask    Intubated:  Postinduction    Mask Ventilation:  Not attempted    Attempts:  1    Attempted By:  CRNA    Difficult Airway Encountered?: No      Complications:  None    Airway Device:  Supraglottic airway/LMA    Airway Device Size:  4.0    Style/Cuff Inflation:  Cuffed (inflated to minimal occlusive pressure)    Placement Verified By:  Capnometry    Complicating Factors:  None    Findings Post-Intubation:  Atraumatic/condition of teeth unchanged

## 2023-02-16 NOTE — DISCHARGE SUMMARY
[facility] - Surgery (Hospital)  Discharge Note  Short Stay    Procedure(s) (LRB):  HEMORRHOIDECTOMY (N/A)  BLOCK, NERVE, PUDENDAL (N/A)      OUTCOME: Patient tolerated treatment/procedure well without complication and is now ready for discharge.    DISPOSITION: Home or Self Care    FINAL DIAGNOSIS:  <principal problem not specified>    FOLLOWUP: In clinic    DISCHARGE INSTRUCTIONS:  No discharge procedures on file.      Clinical Reference Documents Added to Patient Instructions         Document    DOCUSATE, ADULT (ENGLISH)    HEMORRHOIDECTOMY DISCHARGE INSTRUCTIONS (ENGLISH)    IBUPROFEN, ADULT (ENGLISH)    METHOCARBAMOL, ADULT (ENGLISH)    OXYCODONE AND ACETAMINOPHEN, ADULT (ENGLISH)            TIME SPENT ON DISCHARGE: 20 minutes

## 2023-02-16 NOTE — H&P
[facility] -   General Surgery History & Physical    SUBJECTIVE:     History of Present Illness:  Patient is a 56 y.o. male presents with rectal bleeding from hemorrhoids. Underwent a colonoscopy and found to have internal hemorrhoids. Initially he was going to discuss having them banded, but they stopped bleeding. He states they are now bleeding quite a bit.      Review of patient's allergies indicates:  No Known Allergies    Current Outpatient Medications   Medication Sig Dispense Refill    multivitamin capsule Take 1 capsule by mouth once daily.      hydrocortisone (ANUSOL-HC) 25 mg suppository Place 1 suppository (25 mg total) rectally 2 (two) times daily. for 10 days 20 suppository 0     Current Facility-Administered Medications   Medication Dose Route Frequency Provider Last Rate Last Admin    LIDOcaine (PF) 10 mg/ml (1%) injection 10 mg  1 mL Intradermal Once Mansi Kapoor DO           Past Medical History:   Diagnosis Date    Digestive disorder      Past Surgical History:   Procedure Laterality Date    APPENDECTOMY      COLONOSCOPY N/A 1/22/2021    Procedure: COLONOSCOPY;  Surgeon: Kari Rivera MD;  Location: [facility];  Service: Endoscopy;  Laterality: N/A;    THUMB ARTHROSCOPY       Family History   Problem Relation Age of Onset    Heart disease Mother     Hyperlipidemia Mother     Diabetes Paternal Grandmother      Social History     Tobacco Use    Smoking status: Never    Smokeless tobacco: Never        Review of Systems:  Review of Systems   Constitutional:  Negative for fever.   Gastrointestinal:  Positive for anal bleeding.     OBJECTIVE:     Vital Signs (Most Recent)  Temp: 98.2 °F (36.8 °C) (12/09/22 1140)  Pulse: 73 (12/09/22 1140)  BP: 119/75 (12/09/22 1140)     87 kg (191 lb 12.8 oz)     Physical Exam:  Physical Exam  Vitals and nursing note reviewed.   Constitutional:       General: He is not in acute distress.     Appearance: He is not ill-appearing, toxic-appearing  or diaphoretic.   HENT:      Head: Normocephalic.      Nose: Nose normal.      Mouth/Throat:      Mouth: Mucous membranes are moist.   Eyes:      General: No scleral icterus.        Right eye: No discharge.         Left eye: No discharge.      Extraocular Movements: Extraocular movements intact.   Cardiovascular:      Rate and Rhythm: Normal rate and regular rhythm.   Pulmonary:      Effort: No respiratory distress.      Breath sounds: No stridor.   Abdominal:      General: There is no distension.      Palpations: Abdomen is soft.      Tenderness: There is no abdominal tenderness.   Genitourinary:     Comments: Moderately sized external hemorrhoid with thickened mucosa consistent with chronic irritation. Smaller external and visible internal hemorrhoids.  Musculoskeletal:      Cervical back: No rigidity.   Skin:     General: Skin is warm and dry.      Coloration: Skin is not jaundiced or pale.   Neurological:      Mental Status: He is alert and oriented to person, place, and time.   Psychiatric:         Mood and Affect: Mood normal.         Behavior: Behavior normal.     Dr. Jimenez's office note reviewed.    Colonoscopy:  Findings:        A 2 to 3 mm polyp was found in the transverse colon. The polyp was        sessile. The polyp was removed with a cold biopsy forceps. Resection        and retrieval were complete.        A 3 to 4 mm polyp was found in the transverse colon. The polyp was        sessile. The polyp was removed with a cold snare. Resection and        retrieval were complete.        Internal hemorrhoids were found during retroflexion. The hemorrhoids        were moderate.        The exam was otherwise without abnormality on direct and        retroflexion views.        Hemorrhoids were found on perianal exam.   Impression:           - One 2 to 3 mm polyp in the transverse colon,                         removed with a cold biopsy forceps. Resected and                         retrieved.                          - One 3 to 4 mm polyp in the transverse colon,                         removed with a cold snare. Resected and retrieved.                         - Internal hemorrhoids.                         - The examination was otherwise normal on direct                         and retroflexion views.                         - Hemorrhoids found on perianal exam.   Recommendation:       - Discharge patient to home (via wheelchair).                         - Resume previous diet.                         - Continue present medications.                         - Await pathology results.                         - Repeat colonoscopy in 7 years for surveillance.       Final Pathologic Diagnosis COLON, TRANSVERSE, BIOPSY:   - Sessile serrated adenomas with low-grade dysplasia (x2)   - No evidence of high-grade dysplasia or malignancy (multiple deeper levels   examined)        ASSESSMENT/PLAN:     Augustine was seen today for consult.    Diagnoses and all orders for this visit:    Internal and external bleeding hemorrhoids  -     Full code; Standing  -     Place in Outpatient; Standing  -     Case Request Operating Room: HEMORRHOIDECTOMY  -     Insert peripheral IV; Standing  -     Diet NPO; Standing  -     Place sequential compression device; Standing  -     Comprehensive metabolic panel; Future  -     CBC auto differential; Future  -     Full code  -     Insert peripheral IV    Other orders  -     LIDOcaine (PF) 10 mg/ml (1%) injection 10 mg  -     ceFAZolin (ANCEF) 2 g in dextrose 5 % 50 mL IVPB  -     hydrocortisone (ANUSOL-HC) 25 mg suppository; Place 1 suppository (25 mg total) rectally 2 (two) times daily. for 10 days         Will plan for hemorrhoidectomy at the patient's earliest convenience. Will prescribe anusol suppositories. We discussed the importance of avoiding constipation and straining.     After explaining the risks (including bleeding, infection, impaired wound healing, scarring, damage to other organs, vascular  complications, cardiopulmonary complications, and death), benefits, and alternatives, patient verbalized understanding and would like to proceed with surgery. All questions were answered to their satisfaction.      Patient expressed understanding and is in agreement.      Mansi Kapoor,   General Surgery  [facility]

## 2023-02-16 NOTE — TRANSFER OF CARE
"Anesthesia Transfer of Care Note    Patient: Augustine Robledo    Procedure(s) Performed: Procedure(s) (LRB):  HEMORRHOIDECTOMY (N/A)  BLOCK, NERVE, PUDENDAL (N/A)    Patient location: PACU    Anesthesia Type: general    Transport from OR: Transported from OR on room air with adequate spontaneous ventilation    Post pain: adequate analgesia    Post assessment: no apparent anesthetic complications    Post vital signs: stable    Level of consciousness: sedated    Nausea/Vomiting: no nausea/vomiting    Complications: none    Transfer of care protocol was followed      Last vitals:   Visit Vitals  BP (!) 156/97   Pulse 85   Temp 36.3 °C (97.3 °F) (Temporal)   Resp 12   Ht 5' 6" (1.676 m)   Wt 84.8 kg (187 lb 1 oz)   SpO2 95%   BMI 30.19 kg/m²     "

## 2023-02-16 NOTE — PATIENT INSTRUCTIONS
You may take Tylenol (650 mg every 4 hours) and ibuprofen (600 mg every 6 hours) as well as alternate heat and cold packs for pain and swelling. Take ibuprofen with food as it can cause stomach upset and ulcers. Do not take ibuprofen if you have an increased risk of bleeding, history of stomach ulcers, are already taking an NSAID, or have kidney issues.   If taking narcotic pain medication, such as Norco (hydrocodone-acetaminophen) or Percocet (oxycodone-acetaminophen), do not drink alcohol or drive. Each Norco and Percocet tablet contains 325 mg of Tylenol; do not take more than 4000 mg of Tylenol per day. Narcotic pain medications can be constipating so be sure to drink plenty of water and take an over the counter stool softener such as colace (100 mg twice a day) or miralax (17 g once a day).    There is a roll of packing inside of the anal canal.  This packing will come out on its own, usually with a bowel movement.  Do not try to pull the packing out.     After every bowel movement, perform a Sitz bath or cleanse the area with warm water from the shower head.     Do not scratch or pull at the sutures.     Try to avoid prolonged periods of direct pressure on the perianal area by sitting on fluffy cushions or a hemorrhoid donut.  Be sure to shift your weight side-to-side periodically.     You might notice some bleeding or discharge from the area which is normal.  Keep a fresh gauze or pad to absorb any drainage.

## 2023-02-16 NOTE — OP NOTE
Augustine Robledo  : 1965, MRN: [id number]  Date of procedure: 2023      Procedure: Excision of internal and external hemorrhoidal column x1, suture ligation of internal hemorrhoid    Pre-procedure diagnosis: Internal and external bleeding hemorrhoids  Post-procedure diagnosis: Same  Surgeon: Mansi Kapoor DO  Assistant: None  Anesthesia: General  EBL: 50 mL  Implants/Drains: None  Specimen: Hemorrhoid  Complications: None apparent    Significant findings: Large inflamed right anterior internal/external hemorrhoidal column. Large left lateral internal hemorrhoid.    Indications for procedure: See H&P. After explaining the risks, benefits, and alternatives, the patient verbalized understanding and informed written consent was obtained. All questions were answered to their satisfaction.    Description of procedure: The patient was brought to the OR and SCDs were applied. General anesthesia was induced by the Anesthesia Department. Patient was in the lithotomy position. The perianal area was prepped and draped in usual sterile fashion. A time out was taken to identify the correct patient, correct procedure, and correct anatomical site; all parties were in agreement.  A lubricated adams retractor was inserted and the walls of the anal canal were inspected. There was a large inflamed right anterior internal/external hemorrhoidal column and a large left lateral internal hemorrhoid.  Starting with the anterior hemorrhoid, the hemorrhoid was grasped with a clamp to apply gentle outward traction. A 2-0 vicryl fixating suture was placed proximally to the column and a triangular incision was made from this point to just a little beyond the anal skin. The hemorrhoidal tissue was then excised off the sphincter muscle using the ligasure device. A running 3-0 vicryl suture was then used to approximate the mucosal edges together while taking small bites of the sphincter muscle with it.   For the internal hemorrhoid, a  3-0 vicryl suture was used to ligate the hemorrhoid and strangle its blood supply at its base.  The anus was irrigated and good hemostasis was observed. Gel foam packing was inserted into the anus. Exparel was injected around the anus to complete pudendal nerve block.      All sponge and instrument counts were deemed correct. The patient appeared to tolerate the procedure well and there were no apparent complications. The patient was transported to PACU in stable condition.    Mansi Kapoor,   General Surgery  [facility] [place]

## 2023-02-16 NOTE — ANESTHESIA POSTPROCEDURE EVALUATION
Anesthesia Post Evaluation    Patient: Augustine Robledo    Procedure(s) Performed: Procedure(s) (LRB):  HEMORRHOIDECTOMY (N/A)  BLOCK, NERVE, PUDENDAL (N/A)    Final Anesthesia Type: general      Patient location during evaluation: PACU  Patient participation: Yes- Able to Participate  Level of consciousness: awake and alert  Post-procedure vital signs: reviewed and stable  Pain management: adequate  Airway patency: patent  ALLAN mitigation strategies: Extubation while patient is awake  PONV status at discharge: No PONV  Anesthetic complications: no      Cardiovascular status: hemodynamically stable  Respiratory status: spontaneous ventilation  Hydration status: euvolemic  Follow-up not needed.          Vitals Value Taken Time   /97 02/16/23 0906   Temp 36.3 °C (97.3 °F) 02/16/23 0906   Pulse 84 02/16/23 0908   Resp 9 02/16/23 0907   SpO2 97 % 02/16/23 0908   Vitals shown include unvalidated device data.      Event Time   Out of Recovery 09:08:53         Pain/Mark Score: Mark Score: 3 (2/16/2023  8:45 AM)

## 2023-02-19 ENCOUNTER — PATIENT MESSAGE (OUTPATIENT)
Dept: SURGERY | Facility: CLINIC | Age: 58
End: 2023-02-19
Payer: OTHER GOVERNMENT

## 2023-02-20 ENCOUNTER — PATIENT MESSAGE (OUTPATIENT)
Dept: SURGERY | Facility: CLINIC | Age: 58
End: 2023-02-20
Payer: OTHER GOVERNMENT

## 2023-02-20 RX ORDER — METHOCARBAMOL 500 MG/1
1000 TABLET, FILM COATED ORAL 4 TIMES DAILY PRN
Qty: 35 TABLET | Refills: 0 | Status: SHIPPED | OUTPATIENT
Start: 2023-02-20

## 2023-02-21 VITALS
SYSTOLIC BLOOD PRESSURE: 156 MMHG | BODY MASS INDEX: 30.06 KG/M2 | HEART RATE: 85 BPM | TEMPERATURE: 97 F | OXYGEN SATURATION: 95 % | HEIGHT: 66 IN | WEIGHT: 187.06 LBS | DIASTOLIC BLOOD PRESSURE: 97 MMHG | RESPIRATION RATE: 12 BRPM

## 2023-02-23 LAB
FINAL PATHOLOGIC DIAGNOSIS: NORMAL
Lab: NORMAL

## 2023-02-24 ENCOUNTER — OFFICE VISIT (OUTPATIENT)
Dept: SURGERY | Facility: CLINIC | Age: 58
End: 2023-02-24
Payer: OTHER GOVERNMENT

## 2023-02-24 VITALS
BODY MASS INDEX: 30.25 KG/M2 | SYSTOLIC BLOOD PRESSURE: 122 MMHG | WEIGHT: 187.38 LBS | HEART RATE: 64 BPM | DIASTOLIC BLOOD PRESSURE: 78 MMHG

## 2023-02-24 DIAGNOSIS — Z98.890 S/P HEMORRHOIDECTOMY: ICD-10-CM

## 2023-02-24 DIAGNOSIS — Z87.19 S/P HEMORRHOIDECTOMY: ICD-10-CM

## 2023-02-24 DIAGNOSIS — K64.8 INTERNAL AND EXTERNAL BLEEDING HEMORRHOIDS: Primary | ICD-10-CM

## 2023-02-24 DIAGNOSIS — K64.4 INTERNAL AND EXTERNAL BLEEDING HEMORRHOIDS: Primary | ICD-10-CM

## 2023-02-24 PROCEDURE — 99999 PR PBB SHADOW E&M-EST. PATIENT-LVL III: CPT | Mod: PBBFAC,,, | Performed by: SURGERY

## 2023-02-24 PROCEDURE — 99999 PR PBB SHADOW E&M-EST. PATIENT-LVL III: ICD-10-PCS | Mod: PBBFAC,,, | Performed by: SURGERY

## 2023-02-24 PROCEDURE — 99024 PR POST-OP FOLLOW-UP VISIT: ICD-10-PCS | Mod: ,,, | Performed by: SURGERY

## 2023-02-24 PROCEDURE — 99024 POSTOP FOLLOW-UP VISIT: CPT | Mod: ,,, | Performed by: SURGERY

## 2023-02-24 PROCEDURE — 99213 OFFICE O/P EST LOW 20 MIN: CPT | Mod: PBBFAC | Performed by: SURGERY

## 2023-02-24 RX ORDER — IBUPROFEN 600 MG/1
600 TABLET ORAL EVERY 6 HOURS PRN
Qty: 25 TABLET | Refills: 0 | Status: SHIPPED | OUTPATIENT
Start: 2023-02-24

## 2023-02-24 RX ORDER — OXYCODONE AND ACETAMINOPHEN 7.5; 325 MG/1; MG/1
1 TABLET ORAL
Qty: 30 TABLET | Refills: 0 | Status: SHIPPED | OUTPATIENT
Start: 2023-02-24

## 2023-02-24 NOTE — LETTER
February 24, 2023      The [address]  [address] [facility]  [place] [address]  Phone: [phone]  Fax: [phone]       Patient: Augustine Robledo   YOB: 1965  Date of Visit: 02/24/2023    To Whom It May Concern:    Pricila Robledo  was at [facility] on 02/24/2023 for his postoperative follow up. He may return to work with no restrictions. If you have any questions or concerns, or if I can be of further assistance, please do not hesitate to contact me.      Sincerely,      Dr. Mansi Kapoor  General Surgery

## 2023-03-16 ENCOUNTER — PATIENT MESSAGE (OUTPATIENT)
Dept: SURGERY | Facility: CLINIC | Age: 58
End: 2023-03-16
Payer: OTHER GOVERNMENT

## 2023-05-10 NOTE — PROGRESS NOTES
[facility]  General Surgery Progress    SUBJECTIVE:     History of Present Illness:  Patient is a 57 y.o. male presents with rectal bleeding from hemorrhoids. Underwent a colonoscopy and found to have internal hemorrhoids. Initially he was going to discuss having them banded, but they stopped bleeding. He states they are now bleeding quite a bit.    2/24/23 Underwent excision of internal and external hemorrhoidal column x1, suture ligation of internal hemorrhoid on 2/16. Overall, he is doing better. He reports it was quite painful initially, but the pain is improving. He is having some expected bloody spotting/mucus discharge. No fevers. Bowel movements are regular.      Review of patient's allergies indicates:  No Known Allergies    Current Outpatient Medications   Medication Sig Dispense Refill    docusate sodium (COLACE) 100 MG capsule Take 1 capsule (100 mg total) by mouth 2 (two) times daily. 60 capsule 1    ibuprofen (ADVIL,MOTRIN) 600 MG tablet Take 1 tablet (600 mg total) by mouth every 6 (six) hours as needed for Pain. Take with food. 25 tablet 0    methocarbamoL (ROBAXIN) 500 MG Tab Take 2 tablets (1,000 mg total) by mouth 4 (four) times daily as needed (Muscle spasm or pain). 35 tablet 0    multivitamin capsule Take 1 capsule by mouth once daily.      oxyCODONE-acetaminophen (PERCOCET) 7.5-325 mg per tablet Take 1 tablet by mouth every 4 to 6 hours as needed for Pain. 30 tablet 0     No current facility-administered medications for this visit.       Past Medical History:   Diagnosis Date    Digestive disorder      Past Surgical History:   Procedure Laterality Date    APPENDECTOMY      COLONOSCOPY N/A 1/22/2021    Procedure: COLONOSCOPY;  Surgeon: Kari Rivera MD;  Location: [facility] ENDO;  Service: Endoscopy;  Laterality: N/A;    EXCISIONAL HEMORRHOIDECTOMY N/A 2/16/2023    Procedure: HEMORRHOIDECTOMY;  Surgeon: Mansi Kapoor DO;  [facility]: [facility] OR;  Service: General;   Laterality: N/A;    INJECTION OF ANESTHETIC AGENT AROUND PUDENDAL NERVE N/A 2/16/2023    Procedure: BLOCK, NERVE, PUDENDAL;  Surgeon: Mansi Kapoor DO;  Location: [facility] OR;  Service: General;  Laterality: N/A;    THUMB ARTHROSCOPY       Family History   Problem Relation Age of Onset    Heart disease Mother     Hyperlipidemia Mother     Diabetes Paternal Grandmother      Social History     Tobacco Use    Smoking status: Never    Smokeless tobacco: Never   Substance Use Topics    Drug use: Never        Review of Systems:  Review of Systems   Constitutional:  Negative for fever.   Gastrointestinal:  Positive for anal bleeding.     OBJECTIVE:     Vital Signs (Most Recent)  Pulse: 64 (02/24/23 0829)  BP: 122/78 (02/24/23 0829)     85 kg (187 lb 6.3 oz)     Physical Exam:  Physical Exam  Vitals and nursing note reviewed.   Constitutional:       General: He is not in acute distress.     Appearance: He is not ill-appearing, toxic-appearing or diaphoretic.   HENT:      Head: Normocephalic.      Nose: Nose normal.      Mouth/Throat:      Mouth: Mucous membranes are moist.   Eyes:      General: No scleral icterus.        Right eye: No discharge.         Left eye: No discharge.      Extraocular Movements: Extraocular movements intact.   Cardiovascular:      Rate and Rhythm: Normal rate and regular rhythm.   Pulmonary:      Effort: No respiratory distress.      Breath sounds: No stridor.   Abdominal:      General: There is no distension.      Palpations: Abdomen is soft.      Tenderness: There is no abdominal tenderness.   Genitourinary:     Comments: Anal area healing well without signs of infection. No drainage. Good granulation tissue.  Musculoskeletal:      Cervical back: No rigidity.   Skin:     General: Skin is warm and dry.      Coloration: Skin is not jaundiced or pale.   Neurological:      Mental Status: He is alert and oriented to person, place, and time.   Psychiatric:         Mood and Affect: Mood normal.          Behavior: Behavior normal.     Colonoscopy:  Findings:        A 2 to 3 mm polyp was found in the transverse colon. The polyp was        sessile. The polyp was removed with a cold biopsy forceps. Resection        and retrieval were complete.        A 3 to 4 mm polyp was found in the transverse colon. The polyp was        sessile. The polyp was removed with a cold snare. Resection and        retrieval were complete.        Internal hemorrhoids were found during retroflexion. The hemorrhoids        were moderate.        The exam was otherwise without abnormality on direct and        retroflexion views.        Hemorrhoids were found on perianal exam.   Impression:           - One 2 to 3 mm polyp in the transverse colon,                         removed with a cold biopsy forceps. Resected and                         retrieved.                         - One 3 to 4 mm polyp in the transverse colon,                         removed with a cold snare. Resected and retrieved.                         - Internal hemorrhoids.                         - The examination was otherwise normal on direct                         and retroflexion views.                         - Hemorrhoids found on perianal exam.   Recommendation:       - Discharge patient to home (via wheelchair).                         - Resume previous diet.                         - Continue present medications.                         - Await pathology results.                         - Repeat colonoscopy in 7 years for surveillance.       Final Pathologic Diagnosis COLON, TRANSVERSE, BIOPSY:   - Sessile serrated adenomas with low-grade dysplasia (x2)   - No evidence of high-grade dysplasia or malignancy (multiple deeper levels   examined)      Final Pathologic Diagnosis RELIAPATH DIAGNOSIS:   ANUS, ANTERIOR, EXCISION:   - Squamous and glandular mucosa with underlying hemorrhoids and focal surface   erosion.        ASSESSMENT/PLAN:     Augustine was seen today for  post-op evaluation.    Diagnoses and all orders for this visit:    Internal and external bleeding hemorrhoids    S/P hemorrhoidectomy  -     oxyCODONE-acetaminophen (PERCOCET) 7.5-325 mg per tablet; Take 1 tablet by mouth every 4 to 6 hours as needed for Pain.  -     ibuprofen (ADVIL,MOTRIN) 600 MG tablet; Take 1 tablet (600 mg total) by mouth every 6 (six) hours as needed for Pain. Take with food.         Doing well postoperatively. Will refill pain medication. Continue sitz baths, stool softeners, and fiber. Follow up prn.      Patient expressed understanding and is in agreement.      Mansi Kapoor,   General Surgery  [facility]

## 2024-09-30 ENCOUNTER — TELEPHONE (OUTPATIENT)
Dept: GASTROENTEROLOGY | Facility: CLINIC | Age: 59
End: 2024-09-30
Payer: OTHER GOVERNMENT

## 2024-09-30 NOTE — TELEPHONE ENCOUNTER
----- Message from Lucie sent at 9/30/2024 11:31 AM CDT -----  Contact: [phone]  [email or web address]     Patient is calling for Medical Advice regarding:    Patient wants a call back or thru [facility]:Call back     Comments:Pt would like to reschedule  appt coming up on 1/21/25.    Please advise patient replies from provider may take up to 48 hours.

## 2024-12-10 ENCOUNTER — OFFICE VISIT (OUTPATIENT)
Dept: ORTHOPEDICS | Facility: CLINIC | Age: 59
End: 2024-12-10
Payer: OTHER GOVERNMENT

## 2024-12-10 VITALS — HEIGHT: 66 IN | BODY MASS INDEX: 30.11 KG/M2 | WEIGHT: 187.38 LBS

## 2024-12-10 DIAGNOSIS — M18.11 ARTHRITIS OF CARPOMETACARPAL (CMC) JOINT OF RIGHT THUMB: Primary | ICD-10-CM

## 2024-12-10 PROCEDURE — 20600 DRAIN/INJ JOINT/BURSA W/O US: CPT | Mod: PBBFAC,RT | Performed by: ORTHOPAEDIC SURGERY

## 2024-12-10 PROCEDURE — 99204 OFFICE O/P NEW MOD 45 MIN: CPT | Mod: S$PBB,25,, | Performed by: ORTHOPAEDIC SURGERY

## 2024-12-10 PROCEDURE — 99213 OFFICE O/P EST LOW 20 MIN: CPT | Mod: PBBFAC | Performed by: ORTHOPAEDIC SURGERY

## 2024-12-10 PROCEDURE — 99999PBSHW PR PBB SHADOW TECHNICAL ONLY FILED TO HB: Mod: PBBFAC,,,

## 2024-12-10 PROCEDURE — 99999 PR PBB SHADOW E&M-EST. PATIENT-LVL III: CPT | Mod: PBBFAC,,, | Performed by: ORTHOPAEDIC SURGERY

## 2024-12-10 RX ORDER — BETAMETHASONE SODIUM PHOSPHATE AND BETAMETHASONE ACETATE 3; 3 MG/ML; MG/ML
3 INJECTION, SUSPENSION INTRA-ARTICULAR; INTRALESIONAL; INTRAMUSCULAR; SOFT TISSUE
Status: DISCONTINUED | OUTPATIENT
Start: 2024-12-10 | End: 2024-12-10 | Stop reason: HOSPADM

## 2024-12-10 RX ADMIN — BETAMETHASONE ACETATE AND BETAMETHASONE SODIUM PHOSPHATE 3 MG: 3; 3 INJECTION, SUSPENSION INTRA-ARTICULAR; INTRALESIONAL; INTRAMUSCULAR; SOFT TISSUE at 09:12

## 2024-12-10 NOTE — ASSESSMENT & PLAN NOTE
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   Symptoms may resolve, worsen and even become permanent.  The patient understands the treatment options including observation, activity modification, therapy, NSAIDs, splints, injections and the surgical options including thumb CMC arthroplasty.  We discussed the risks of the diagnosis and the treatment options including pain, infection, bleeding, damage to nerves and vessels, stiffness, scarring, incomplete relief or recurrence of symptoms, poor pain and functional outcomes.  Unique risks of this diagnosis and the treatment include persistent pain and deformity.  The patients treatment is further complicated by chronicity of his arthritis.  The patient has elected to proceed with right thumb CMC injection and we will follow up as needed.

## 2024-12-10 NOTE — PROCEDURES
Small Joint Aspiration/Injection: R thumb CMC    Date/Time: 12/10/2024 9:45 AM    Performed by: Bentley Ball MD  Authorized by: Bentley Ball MD    Consent Done?:  Yes (Verbal)  Indications:  Arthritis  Site marked: the procedure site was marked    Timeout: prior to procedure the correct patient, procedure, and site was verified    Prep: patient was prepped and draped in usual sterile fashion      Local anesthetic:  Lidocaine 1% without epinephrine  Anesthetic total (ml):  0.5    Location:  Thumb  Site:  R thumb CMC  Needle size:  27 G  Approach:  Dorsal  Medications:  3 mg betamethasone acetate-betamethasone sodium phosphate 6 mg/mL  Patient tolerance:  Patient tolerated the procedure well with no immediate complications    Additional Comments: I have explained the risks, benefits, and alternatives of the procedure in detail.  The patient voices understanding and all questions have been answered.  The patient agrees to proceed as planned. After sterile prep the right  thumb cmc joint was injected while being aware of the relevant neurovascular structures with 3mg celestone and 0.5mL of 1% lidocaine with a 27g needle. The patient tolerated the injection well. The patient understands that immediate relief of pain is secondary to the local anesthetic and will be temporary.  After the anesthetic wears off there may be a increase in pain that may last for a few hours or a few days and they should use ice to help alleviate this flair up of pain.

## 2024-12-10 NOTE — PROGRESS NOTES
ZULEYKA Ball M.D.  Orthopaedic Hand and Wrist Surgery  [facility] Orthopedic[address]    Patient ID: Augustine Robledo  YOB: 1965  MRN: [id number]    Diagnosis:   Right thumb CMC arthritis    History of Present Illness: Augustine Robledo is a 58 y.o. male well known to me from bone and joint clinic with a several year history of right thumb CMC arthritis he responds well to injections he feels like he would like to proceed with right thumb CMC arthroplasty at the end of  but we would like to try an injection again today    Patient was queried and this is the extent of the patients current complaints today.    Physical Exam:   Skin:  No open wounds erythema or signs of infection  Sensation:  No decreased sensation  Motor:  Kapandji score of 10 intact EPL and FPL  Swelling:  Mild swelling  Positive thumb CMC grind  Negative Finkelstein's    Imaging:  None    Provider Note/Medical Decision Makin. Arthritis of carpometacarpal (CMC) joint of right thumb  Assessment & Plan:  The patient and I talked at length about the natural history and pathophysiology of right thumb CMC arthritis, he understands that this is a chronic problem which may have acute episodic exacerbations.   Symptoms may resolve, worsen and even become permanent.  The patient understands the treatment options including observation, activity modification, therapy, NSAIDs, splints, injections and the surgical options including thumb CMC arthroplasty.  We discussed the risks of the diagnosis and the treatment options including pain, infection, bleeding, damage to nerves and vessels, stiffness, scarring, incomplete relief or recurrence of symptoms, poor pain and functional outcomes.  Unique risks of this diagnosis and the treatment include persistent pain and deformity.  The patients treatment is further complicated by chronicity of his arthritis.  The patient has elected to proceed with right thumb CMC injection and we will follow  up as needed.      Orders:  -     Small Joint Aspiration/Injection: R thumb CMC         I discussed worrisome and red flag signs and symptoms with the patient. The patient expressed understanding and agreed to alert me immediately or to go to the emergency room if they experience any of these.   Treatment plan was developed with input from the patient/family, and they expressed understanding and agreement with the plan. All questions were answered today.    There are no Patient Instructions on file for this visit.    ZULEYKA Ball M.D.  [facility]  Orthopedic Hand and Wrist Surgeon    [facility]  Dr. Jonathan Ball   [employer]     Disclaimer: This note was prepared using a voice recognition system and is likely to have sound alike errors within the text.

## 2025-01-31 ENCOUNTER — OFFICE VISIT (OUTPATIENT)
Dept: GASTROENTEROLOGY | Facility: CLINIC | Age: 60
End: 2025-01-31
Payer: OTHER GOVERNMENT

## 2025-01-31 VITALS
WEIGHT: 189.13 LBS | HEIGHT: 66 IN | DIASTOLIC BLOOD PRESSURE: 85 MMHG | BODY MASS INDEX: 30.4 KG/M2 | SYSTOLIC BLOOD PRESSURE: 130 MMHG | HEART RATE: 69 BPM

## 2025-01-31 DIAGNOSIS — Z86.0100 HISTORY OF COLON POLYPS: Primary | ICD-10-CM

## 2025-01-31 PROBLEM — R19.5 POSITIVE FIT (FECAL IMMUNOCHEMICAL TEST): Status: RESOLVED | Noted: 2021-01-21 | Resolved: 2025-01-31

## 2025-01-31 PROCEDURE — 99999 PR PBB SHADOW E&M-EST. PATIENT-LVL IV: CPT | Mod: PBBFAC,,, | Performed by: NURSE PRACTITIONER

## 2025-01-31 PROCEDURE — 99214 OFFICE O/P EST MOD 30 MIN: CPT | Mod: PBBFAC | Performed by: NURSE PRACTITIONER

## 2025-01-31 PROCEDURE — 99499 UNLISTED E&M SERVICE: CPT | Mod: S$PBB,,, | Performed by: NURSE PRACTITIONER

## 2025-01-31 RX ORDER — TRAZODONE HYDROCHLORIDE 100 MG/1
200 TABLET ORAL NIGHTLY
COMMUNITY

## 2025-01-31 RX ORDER — POLYETHYLENE GLYCOL 3350, SODIUM SULFATE ANHYDROUS, SODIUM BICARBONATE, SODIUM CHLORIDE, POTASSIUM CHLORIDE 236; 22.74; 6.74; 5.86; 2.97 G/4L; G/4L; G/4L; G/4L; G/4L
4 POWDER, FOR SOLUTION ORAL ONCE
Qty: 4000 ML | Refills: 0 | Status: SHIPPED | OUTPATIENT
Start: 2025-01-31 | End: 2025-01-31

## 2025-01-31 NOTE — PROGRESS NOTES
Clinic Follow Up:  [facility] Follow Up Note    Reason for Follow Up:  The encounter diagnosis was History of colon polyps.    PCP: Alcira, Primary Doctor   [address] [facility] / [facility]*    HPI:  This is a 59 y.o. male here for follow up of colon cancer screening.   [facility]   Prior colonoscopy?: yes  Date of last colonoscopy: 2021  History of colon polyps: yes  Recall: 3 years  Family history of colon cancer: no  Abdominal pain, hematochezia, melena, nausea, vomiting, or weight loss: no    Review of Systems   Constitutional:  Negative for activity change and appetite change.        As per interval history above   Respiratory:  Negative for cough and shortness of breath.    Cardiovascular:  Negative for chest pain.   Gastrointestinal:  Negative for abdominal pain and blood in stool.   Skin:  Negative for color change and rash.       Medical History:  Past Medical History:   Diagnosis Date    Digestive disorder        Surgical History:   Past Surgical History:   Procedure Laterality Date    APPENDECTOMY      COLONOSCOPY N/A 1/22/2021    Procedure: COLONOSCOPY;  Surgeon: Kari Rivera MD;  Location: [facility];  Service: Endoscopy;  Laterality: N/A;    EXCISIONAL HEMORRHOIDECTOMY N/A 2/16/2023    Procedure: HEMORRHOIDECTOMY;  Surgeon: Mansi Kapoor DO;  Location: [facility] OR;  Service: General;  Laterality: N/A;    INJECTION OF ANESTHETIC AGENT AROUND PUDENDAL NERVE N/A 2/16/2023    Procedure: BLOCK, NERVE, PUDENDAL;  Surgeon: Mansi Kapoor DO;  Location: [facility] OR;  Service: General;  Laterality: N/A;    THUMB ARTHROSCOPY         Family History:   Family History   Problem Relation Name Age of Onset    Heart disease Mother      Hyperlipidemia Mother      Diabetes Paternal Grandmother         Social History:   Social History     Tobacco Use    Smoking status: Never    Smokeless tobacco: Never   Substance Use Topics    Drug  "use: Never       Allergies: Review of patient's allergies indicates:  No Known Allergies    Home Medications:  Current Outpatient Medications on File Prior to Visit   Medication Sig Dispense Refill    multivitamin capsule Take 1 capsule by mouth once daily.      traZODone (DESYREL) 100 MG tablet Take 200 mg by mouth every evening.      [DISCONTINUED] docusate sodium (COLACE) 100 MG capsule Take 1 capsule (100 mg total) by mouth 2 (two) times daily. 60 capsule 1    [DISCONTINUED] ibuprofen (ADVIL,MOTRIN) 600 MG tablet Take 1 tablet (600 mg total) by mouth every 6 (six) hours as needed for Pain. Take with food. 25 tablet 0    [DISCONTINUED] methocarbamoL (ROBAXIN) 500 MG Tab Take 2 tablets (1,000 mg total) by mouth 4 (four) times daily as needed (Muscle spasm or pain). 35 tablet 0    [DISCONTINUED] oxyCODONE-acetaminophen (PERCOCET) 7.5-325 mg per tablet Take 1 tablet by mouth every 4 to 6 hours as needed for Pain. 30 tablet 0     No current facility-administered medications on file prior to visit.       /85 (BP Location: Left arm, Patient Position: Sitting)   Pulse 69   Ht 5' 6" (1.676 m)   Wt 85.8 kg (189 lb 2.5 oz)   BMI 30.53 kg/m²   Body mass index is 30.53 kg/m².  Physical Exam    Labs: Pertinent labs reviewed.  CRC Screening: due     Assessment:   1. History of colon polyps        Recommendations:   - screening colonoscopy     History of colon polyps  -     Ambulatory referral/consult to Endo Procedure   -     polyethylene glycol (GOLYTELY) 236-22.74-6.74 -5.86 gram suspension; Take 4,000 mLs (4 L total) by mouth once. for 1 dose  Dispense: 4000 mL; Refill: 0    Return to Clinic:  Follow up if symptoms worsen or fail to improve.    Thank you for the opportunity to participate in the care of this patient.  JACK Galvan        "

## 2025-02-03 ENCOUNTER — HOSPITAL ENCOUNTER (OUTPATIENT)
Dept: PREADMISSION TESTING | Facility: HOSPITAL | Age: 60
Discharge: HOME | End: 2025-02-03
Attending: NURSE PRACTITIONER
Payer: OTHER GOVERNMENT

## 2025-02-03 DIAGNOSIS — Z86.0100 HISTORY OF COLON POLYPS: Primary | ICD-10-CM

## 2025-02-17 RX ORDER — AZELASTINE 1 MG/ML
SPRAY, METERED NASAL
COMMUNITY
Start: 2024-03-06

## 2025-02-17 RX ORDER — SILDENAFIL 100 MG/1
100 TABLET, FILM COATED ORAL
COMMUNITY
Start: 2024-03-21

## 2025-02-17 RX ORDER — TRETINOIN 0.25 MG/G
CREAM TOPICAL
COMMUNITY
Start: 2024-08-28

## 2025-02-17 RX ORDER — FLUTICASONE PROPIONATE 50 MCG
SPRAY, SUSPENSION (ML) NASAL
COMMUNITY
Start: 2024-03-06

## 2025-02-20 ENCOUNTER — ANESTHESIA EVENT (OUTPATIENT)
Dept: ENDOSCOPY | Facility: HOSPITAL | Age: 60
End: 2025-02-20
Payer: OTHER GOVERNMENT

## 2025-02-20 NOTE — ANESTHESIA PREPROCEDURE EVALUATION
02/20/2025  Augustine Robledo is a 59 y.o., male.    Past Medical History:   Diagnosis Date    Digestive disorder      Past Surgical History:   Procedure Laterality Date    APPENDECTOMY      COLONOSCOPY N/A 1/22/2021    Procedure: COLONOSCOPY;  Surgeon: Kari Rivera MD;  Location: [facility];  Service: Endoscopy;  Laterality: N/A;    EXCISIONAL HEMORRHOIDECTOMY N/A 2/16/2023    Procedure: HEMORRHOIDECTOMY;  Surgeon: Mansi Kapoor DO;  Location: [facility] OR;  Service: General;  Laterality: N/A;    INJECTION OF ANESTHETIC AGENT AROUND PUDENDAL NERVE N/A 2/16/2023    Procedure: BLOCK, NERVE, PUDENDAL;  Surgeon: Mansi Kapoor DO;  Location: [facility] OR;  Service: General;  Laterality: N/A;    THUMB ARTHROSCOPY         Pre-op Assessment    I have reviewed the Patient Summary Reports.     I have reviewed the Nursing Notes. I have reviewed the NPO Status.   I have reviewed the Medications.     Review of Systems  Anesthesia Hx:  No problems with previous Anesthesia   History of prior surgery of interest to airway management or planning:          Denies Family Hx of Anesthesia complications.    Denies Personal Hx of Anesthesia complications.                    Social:  Non-Smoker, No Alcohol Use       Hepatic/GI:  Bowel Prep.                   Endocrine:        Obesity / BMI > 30      Physical Exam  General: Well nourished, Cooperative, Alert and Oriented    Airway:  Mallampati: II   Mouth Opening: Normal  TM Distance: Normal  Tongue: Normal  Neck ROM: Normal ROM    Dental:  Intact        Anesthesia Plan  Type of Anesthesia, risks & benefits discussed:    Anesthesia Type: Gen Natural Airway  Intra-op Monitoring Plan: Standard ASA Monitors  Post Op Pain Control Plan: multimodal analgesia  Induction:  IV  Informed Consent: Informed consent signed with the Patient and all parties understand the risks and agree  with anesthesia plan.  All questions answered. Patient consented to blood products? No  ASA Score: 2  Day of Surgery Review of History & Physical: H&P Update referred to the surgeon/provider.    Ready For Surgery From Anesthesia Perspective.     .

## 2025-02-21 ENCOUNTER — HOSPITAL ENCOUNTER (OUTPATIENT)
Dept: ENDOSCOPY | Facility: HOSPITAL | Age: 60
Discharge: HOME | End: 2025-02-21
Attending: NURSE PRACTITIONER
Payer: OTHER GOVERNMENT

## 2025-02-21 ENCOUNTER — ANESTHESIA (OUTPATIENT)
Dept: ENDOSCOPY | Facility: HOSPITAL | Age: 60
End: 2025-02-21
Payer: OTHER GOVERNMENT

## 2025-02-21 VITALS
WEIGHT: 181.44 LBS | BODY MASS INDEX: 29.16 KG/M2 | TEMPERATURE: 97 F | HEART RATE: 60 BPM | SYSTOLIC BLOOD PRESSURE: 118 MMHG | DIASTOLIC BLOOD PRESSURE: 69 MMHG | RESPIRATION RATE: 20 BRPM | HEIGHT: 66 IN | OXYGEN SATURATION: 98 %

## 2025-02-21 DIAGNOSIS — Z86.0100 HISTORY OF COLON POLYPS: ICD-10-CM

## 2025-02-21 PROCEDURE — 63600175 PHARM REV CODE 636 W HCPCS: Performed by: NURSE ANESTHETIST, CERTIFIED REGISTERED

## 2025-02-21 PROCEDURE — 37000009 HC ANESTHESIA EA ADD 15 MINS

## 2025-02-21 PROCEDURE — G0105 COLORECTAL SCRN; HI RISK IND: HCPCS | Performed by: INTERNAL MEDICINE

## 2025-02-21 PROCEDURE — 37000008 HC ANESTHESIA 1ST 15 MINUTES

## 2025-02-21 PROCEDURE — G0105 COLORECTAL SCRN; HI RISK IND: HCPCS | Mod: ,,, | Performed by: INTERNAL MEDICINE

## 2025-02-21 RX ORDER — LIDOCAINE HYDROCHLORIDE 20 MG/ML
INJECTION, SOLUTION EPIDURAL; INFILTRATION; INTRACAUDAL; PERINEURAL
Status: DISCONTINUED | OUTPATIENT
Start: 2025-02-21 | End: 2025-02-21

## 2025-02-21 RX ORDER — PROPOFOL 10 MG/ML
VIAL (ML) INTRAVENOUS
Status: DISCONTINUED | OUTPATIENT
Start: 2025-02-21 | End: 2025-02-21

## 2025-02-21 RX ADMIN — LIDOCAINE HYDROCHLORIDE 40 MG: 20 INJECTION, SOLUTION EPIDURAL; INFILTRATION; INTRACAUDAL; PERINEURAL at 07:02

## 2025-02-21 RX ADMIN — PROPOFOL 100 MG: 10 INJECTION, EMULSION INTRAVENOUS at 07:02

## 2025-02-21 RX ADMIN — PROPOFOL 40 MG: 10 INJECTION, EMULSION INTRAVENOUS at 07:02

## 2025-02-21 RX ADMIN — PROPOFOL 50 MG: 10 INJECTION, EMULSION INTRAVENOUS at 07:02

## 2025-02-21 NOTE — TRANSFER OF CARE
"Anesthesia Transfer of Care Note    Patient: Augustine Robledo    Procedure(s) Performed: * No procedures listed *    Patient location: PACU    Anesthesia Type: general    Transport from OR: Transported from OR on room air with adequate spontaneous ventilation    Post pain: adequate analgesia    Post assessment: no apparent anesthetic complications and tolerated procedure well    Post vital signs: stable    Level of consciousness: awake    Nausea/Vomiting: no nausea/vomiting    Complications: none    Transfer of care protocol was followed      Last vitals: Visit Vitals  /89   Pulse 66   Temp 36.3 °C (97.3 °F) (Temporal)   Resp 17   Ht 5' 6" (1.676 m)   Wt 82.3 kg (181 lb 7 oz)   SpO2 97%   BMI 29.28 kg/m²     "

## 2025-02-21 NOTE — H&P
PRE PROCEDURE H&P    Patient Name: Augustine Robledo  MRN: [id number]  : 1965  Date of Procedure:  2025  Referring Physician: Fani Salamanca NP  Primary Physician: Alcira, Primary Doctor  Procedure Physician: Cabrera Luna MD       Planned Procedure: Colonoscopy  Diagnosis: previous adenomatous polyp  Chief Complaint: Same as above    HPI: Patient is an 59 y.o. male is here for the above.     Last colonoscopy:   Family history: None  Anticoagulation: None    Past Medical History:   Past Medical History:   Diagnosis Date    Digestive disorder         Past Surgical History:  Past Surgical History:   Procedure Laterality Date    APPENDECTOMY      COLONOSCOPY N/A 2021    Procedure: COLONOSCOPY;  Surgeon: Kari Rivera MD;  Location: [facility];  Service: Endoscopy;  Laterality: N/A;    EXCISIONAL HEMORRHOIDECTOMY N/A 2023    Procedure: HEMORRHOIDECTOMY;  Surgeon: Mansi Kapoor DO;  Location: [facility] OR;  Service: General;  Laterality: N/A;    INJECTION OF ANESTHETIC AGENT AROUND PUDENDAL NERVE N/A 2023    Procedure: BLOCK, NERVE, PUDENDAL;  Surgeon: Mansi Kapoor DO;  Location: [facility] OR;  Service: General;  Laterality: N/A;    THUMB ARTHROSCOPY          Home Medications:  Prior to Admission medications    Medication Sig Start Date End Date Taking? Authorizing Provider   azelastine (ASTELIN) 137 mcg (0.1 %) nasal spray by Nasal route. 3/6/24  Yes Provider, Historical   fluticasone propionate (FLONASE) 50 mcg/actuation nasal spray by Nasal route. 3/6/24  Yes Provider, Historical   sildenafiL (VIAGRA) 100 MG tablet Take 100 mg by mouth. 3/21/24  Yes Provider, Historical   tretinoin (RETIN-A) 0.025 % cream Apply topically. 24  Yes Provider, Historical   multivitamin capsule Take 1 capsule by mouth once daily.    Provider, Historical   traZODone (DESYREL) 100 MG tablet Take 200 mg by mouth every evening.    Provider, Historical        Allergies:  Review of patient's  "allergies indicates:  No Known Allergies     Social History:   Social History[1]    Family History:  Family History   Problem Relation Name Age of Onset    Heart disease Mother      Hyperlipidemia Mother      Diabetes Paternal Grandmother         ROS: No acute cardiac events, no acute respiratory complaints.     Physical Exam (all patients):    /89   Pulse 66   Temp 97.3 °F (36.3 °C) (Temporal)   Resp 17   Ht 5' 6" (1.676 m)   Wt 82.3 kg (181 lb 7 oz)   SpO2 97%   BMI 29.28 kg/m²   Lungs: Clear to auscultation bilaterally, respirations unlabored  Heart: Regular rate and rhythm, S1 and S2 normal, no obvious murmurs  Abdomen:         Soft, non-tender, bowel sounds normal, no masses, no organomegaly    Lab Results   Component Value Date    WBC 5.6 08/29/2024    MCV 89 02/03/2023    RDW 13.4 08/29/2024     08/29/2024     02/03/2023    HGBA1C 7.2 (H) 08/29/2024    BUN 17 02/03/2023     02/03/2023    K 5.0 02/03/2023     02/03/2023        SEDATION PLAN: per anesthesia      History reviewed, vital signs satisfactory, cardiopulmonary status satisfactory, sedation options, risks and plans have been discussed with the patient  All their questions were answered and the patient agrees to the sedation procedures as planned and the patient is deemed an appropriate candidate for the sedation as planned.    Procedure explained to patient, informed consent obtained and placed in chart.    Cabrera Luna  2/21/2025  6:50AM          [1]   Social History  Socioeconomic History    Marital status:    Tobacco Use    Smoking status: Never    Smokeless tobacco: Never   Substance and Sexual Activity    Drug use: Never     "

## 2025-02-21 NOTE — PLAN OF CARE
Discharge instructions reviewed with patient and visitor. Handouts given & verbalized understanding with no further questions at this time. Dr Luna spoke to pt at bedside, reviewed procedure and findings, answered questions.  MD telephone number provided per AVS sheet. VSS on RA, no pain or nausea noted, tolerating po fluids, no complaints noted. Fall precautions reviewed, consents in chart, PIV removed at this time.

## 2025-02-21 NOTE — ANESTHESIA POSTPROCEDURE EVALUATION
Anesthesia Post Evaluation    Patient: Augustine Robledo    Procedure(s) Performed: * No procedures listed *    Final Anesthesia Type: general      Patient location during evaluation: PACU  Patient participation: Yes- Able to Participate  Level of consciousness: awake  Post-procedure vital signs: reviewed and stable  Pain management: adequate  Airway patency: patent    PONV status at discharge: No PONV  Anesthetic complications: no      Cardiovascular status: stable  Respiratory status: unassisted  Hydration status: euvolemic  Follow-up not needed.              Vitals Value Taken Time   /69 02/21/25 07:55        Pulse 60 02/21/25 07:55   Resp 20 02/21/25 07:55   SpO2 98 % 02/21/25 07:55         No case tracking events are documented in the log.      Pain/Mark Score: Mark Score: 10 (2/21/2025  7:55 AM)

## 2025-02-21 NOTE — DISCHARGE INSTRUCTIONS
During your procedure today, you received medications for sedation.  These   medications may affect your judgment, balance and coordination.  Therefore,   for 24 hours, you have the following restrictions:     - DO NOT drive a car, operate machinery, make legal/financial decisions,   sign important papers or drink alcohol.      ACTIVITY:  Today: no heavy lifting, straining or running due to procedural   sedation/anesthesia.  The following day: return to full activity including work.    DIET:  Eat and drink normally unless instructed otherwise.                TREATMENT FOR COMMON SIDE EFFECTS:  - Mild abdominal pain, nausea, belching, bloating or excessive gas:  rest,   eat lightly and use a heating pad.  - Sore Throat: treat with throat lozenges and/or gargle with warm salt   water.  - Because air was used during the procedure, expelling large amounts of air   from your rectum or belching is normal.  - If a bowel prep was taken, you may not have a bowel movement for 1-3 days.    This is normal.    SYMPTOMS TO WATCH FOR AND REPORT TO YOUR PHYSICIAN:  1. Abdominal pain or bloating, other than gas cramps.  2. Chest pain.  3. Back pain.  4. Signs of infection such as: chills or fever occurring within 24 hours   after the procedure.  5. Rectal bleeding, which would show as bright red, maroon, or black stools.   (A tablespoon of blood from the rectum is not serious, especially if   hemorrhoids are present.)  6. Vomiting.  7. Weakness or dizziness.    GO DIRECTLY TO THE NEAREST EMERGENCY ROOM IF YOU HAVE ANY OF THE FOLLOWING:                 Difficulty breathing              Chills and/or fever over 101 F              Persistent vomiting and/or vomiting blood              Severe abdominal pain              Severe chest pain              Black, tarry stools              Bleeding- more than one tablespoon              Any other symptom or condition that you feel may need urgent attention    Your doctor recommends these  additional instructions:  If any biopsies were taken, your doctors clinic will contact you in 1 to 2   weeks with any results.  - Discharge patient to home.   - Resume previous diet.   - Continue present medications.    - Repeat colonoscopy in __ years for surveillance.   - Return to referring physician as previously scheduled.   - Patient has a contact number available for emergencies.  The signs and   symptoms of potential delayed complications were discussed with the   patient.  Return to normal activities tomorrow.  Written discharge   instructions were provided to the patient.    If you have any questions about the above instructions, call the GI   department at [phone] or call the endoscopy unit at [phone]   from 7am until 3 pm.  [facility], EMERGENCY ROOM PHONE NUMBER:   [phone]  IF A COMPLICATION OR EMERGENCY SITUATION ARISES AND YOU ARE UNABLE TO REACH   YOUR PHYSICIAN - GO DIRECTLY TO THE EMERGENCY ROOM.

## 2025-03-18 ENCOUNTER — PATIENT MESSAGE (OUTPATIENT)
Dept: ORTHOPEDICS | Facility: CLINIC | Age: 60
End: 2025-03-18
Payer: OTHER GOVERNMENT

## 2025-03-27 ENCOUNTER — OFFICE VISIT (OUTPATIENT)
Dept: ORTHOPEDICS | Facility: CLINIC | Age: 60
End: 2025-03-27
Payer: OTHER GOVERNMENT

## 2025-03-27 DIAGNOSIS — M18.11 ARTHRITIS OF CARPOMETACARPAL (CMC) JOINT OF RIGHT THUMB: Primary | ICD-10-CM

## 2025-03-27 PROCEDURE — 99999 PR PBB SHADOW E&M-EST. PATIENT-LVL II: CPT | Mod: PBBFAC,,, | Performed by: ORTHOPAEDIC SURGERY

## 2025-03-27 PROCEDURE — 99212 OFFICE O/P EST SF 10 MIN: CPT | Mod: PBBFAC | Performed by: ORTHOPAEDIC SURGERY

## 2025-03-27 PROCEDURE — 99999PBSHW PR PBB SHADOW TECHNICAL ONLY FILED TO HB: Mod: PBBFAC,,,

## 2025-03-27 PROCEDURE — 20600 DRAIN/INJ JOINT/BURSA W/O US: CPT | Mod: PBBFAC,RT | Performed by: ORTHOPAEDIC SURGERY

## 2025-03-27 RX ORDER — BETAMETHASONE SODIUM PHOSPHATE AND BETAMETHASONE ACETATE 3; 3 MG/ML; MG/ML
3 INJECTION, SUSPENSION INTRA-ARTICULAR; INTRALESIONAL; INTRAMUSCULAR; SOFT TISSUE
Status: DISCONTINUED | OUTPATIENT
Start: 2025-03-27 | End: 2025-03-27 | Stop reason: HOSPADM

## 2025-03-27 RX ADMIN — BETAMETHASONE ACETATE AND BETAMETHASONE SODIUM PHOSPHATE 3 MG: 3; 3 INJECTION, SUSPENSION INTRA-ARTICULAR; INTRALESIONAL; INTRAMUSCULAR; SOFT TISSUE at 04:03

## 2025-03-27 NOTE — PROGRESS NOTES
ZULEYKA Ball M.D.  Orthopaedic Hand and Wrist Surgery  [facility]    Patient ID: Augustine Robledo  YOB: 1965  MRN: [id number]    Diagnosis:   Right thumb CMC arthritis    History of Present Illness: Augustine Robledo is a 59 y.o. male with chronic right thumb CMC arthritis injection seemed to give him 4-5 months of relief he is here today requesting another injection    Patient was queried and this is the extent of the patients current complaints today.    Physical Exam:   Skin:  No skin changes  Positive right thumb CMC grind  Negative Finkelstein's  Tenderness of the thumb CMC joint  No tenderness over the wrist STT joint      Provider Note/Medical Decision Makin. Arthritis of carpometacarpal (CMC) joint of right thumb  Assessment & Plan:  The patient and I talked at length about the natural history and pathophysiology of right thumb CMC arthritis, he understands that this is a chronic problem which may have acute episodic exacerbations.   Symptoms may resolve, worsen and even become permanent.  The patient understands the treatment options including observation, activity modification, therapy, NSAIDs, splints, injections and the surgical options including thumb CMC arthroplasty.  We discussed the risks of the diagnosis and the treatment options including pain, infection, bleeding, damage to nerves and vessels, stiffness, scarring, incomplete relief or recurrence of symptoms, poor pain and functional outcomes.  Unique risks of this diagnosis and the treatment include persistent pain and deformity.  The patients treatment is further complicated by chronicity of his arthritis.  The patient has elected to proceed with right thumb CMC injection and we will follow up as needed.      Orders:  -     Small Joint Aspiration/Injection: R thumb CMC         I discussed worrisome and red flag signs and symptoms with the patient. The patient expressed understanding and agreed to  alert me immediately or to go to the emergency room if they experience any of these.   Treatment plan was developed with input from the patient/family, and they expressed understanding and agreement with the plan. All questions were answered today.    There are no Patient Instructions on file for this visit.    ZULEYKA Ball M.D.  [facility]  Orthopedic Hand and Wrist Surgeon    [facility] Hand Specialist  Dr. Jonathan Ball   [employer]   [place]     Disclaimer: This note was prepared using a voice recognition system and is likely to have sound alike errors within the text.

## 2025-03-27 NOTE — PROCEDURES
Small Joint Aspiration/Injection: R thumb CMC    Date/Time: 3/27/2025 4:15 PM    Performed by: Bentley Ball MD  Authorized by: Bentley Ball MD    Consent Done?:  Yes (Verbal)  Indications:  Arthritis  Site marked: the procedure site was marked    Timeout: prior to procedure the correct patient, procedure, and site was verified    Prep: patient was prepped and draped in usual sterile fashion      Local anesthetic:  Lidocaine 1% without epinephrine  Anesthetic total (ml):  0.5    Location:  Thumb  Site:  R thumb CMC  Needle size:  27 G  Approach:  Dorsal  Medications:  3 mg betamethasone acetate-betamethasone sodium phosphate 6 mg/mL  Patient tolerance:  Patient tolerated the procedure well with no immediate complications    Additional Comments: I have explained the risks, benefits, and alternatives of the procedure in detail.  The patient voices understanding and all questions have been answered.  The patient agrees to proceed as planned. After sterile prep the right  thumb cmc joint was injected while being aware of the relevant neurovascular structures with 3mg celestone and 0.5mL of 1% lidocaine with a 27g needle. The patient tolerated the injection well. The patient understands that immediate relief of pain is secondary to the local anesthetic and will be temporary.  After the anesthetic wears off there may be a increase in pain that may last for a few hours or a few days and they should use ice to help alleviate this flair up of pain.

## 2025-06-30 ENCOUNTER — PATIENT MESSAGE (OUTPATIENT)
Dept: ORTHOPEDICS | Facility: CLINIC | Age: 60
End: 2025-06-30
Payer: OTHER GOVERNMENT

## 2025-06-30 DIAGNOSIS — M25.562 PAIN IN BOTH KNEES, UNSPECIFIED CHRONICITY: Primary | ICD-10-CM

## 2025-06-30 DIAGNOSIS — M25.561 PAIN IN BOTH KNEES, UNSPECIFIED CHRONICITY: Primary | ICD-10-CM

## 2025-07-02 ENCOUNTER — HOSPITAL ENCOUNTER (OUTPATIENT)
Dept: RADIOLOGY | Facility: HOSPITAL | Age: 60
Discharge: HOME | End: 2025-07-02
Attending: ORTHOPAEDIC SURGERY
Payer: OTHER GOVERNMENT

## 2025-07-02 ENCOUNTER — OFFICE VISIT (OUTPATIENT)
Dept: ORTHOPEDICS | Facility: CLINIC | Age: 60
End: 2025-07-02
Payer: OTHER GOVERNMENT

## 2025-07-02 VITALS — BODY MASS INDEX: 31.43 KG/M2 | HEIGHT: 66 IN | WEIGHT: 195.56 LBS

## 2025-07-02 DIAGNOSIS — M25.561 PAIN IN BOTH KNEES, UNSPECIFIED CHRONICITY: ICD-10-CM

## 2025-07-02 DIAGNOSIS — M18.11 ARTHRITIS OF CARPOMETACARPAL (CMC) JOINT OF RIGHT THUMB: Primary | ICD-10-CM

## 2025-07-02 DIAGNOSIS — M25.562 PAIN IN BOTH KNEES, UNSPECIFIED CHRONICITY: ICD-10-CM

## 2025-07-02 DIAGNOSIS — M17.11 PRIMARY OSTEOARTHRITIS OF RIGHT KNEE: Primary | ICD-10-CM

## 2025-07-02 DIAGNOSIS — M17.12 PRIMARY OSTEOARTHRITIS OF LEFT KNEE: ICD-10-CM

## 2025-07-02 PROCEDURE — 99999 PR PBB SHADOW E&M-EST. PATIENT-LVL II: CPT | Mod: PBBFAC,,, | Performed by: ORTHOPAEDIC SURGERY

## 2025-07-02 PROCEDURE — 20600 DRAIN/INJ JOINT/BURSA W/O US: CPT | Mod: PBBFAC,RT | Performed by: ORTHOPAEDIC SURGERY

## 2025-07-02 PROCEDURE — 99213 OFFICE O/P EST LOW 20 MIN: CPT | Mod: PBBFAC,25 | Performed by: ORTHOPAEDIC SURGERY

## 2025-07-02 PROCEDURE — 99999 PR PBB SHADOW E&M-EST. PATIENT-LVL III: CPT | Mod: PBBFAC,,, | Performed by: ORTHOPAEDIC SURGERY

## 2025-07-02 PROCEDURE — 99212 OFFICE O/P EST SF 10 MIN: CPT | Mod: PBBFAC,25,27 | Performed by: ORTHOPAEDIC SURGERY

## 2025-07-02 PROCEDURE — 73564 X-RAY EXAM KNEE 4 OR MORE: CPT | Mod: TC,50

## 2025-07-02 PROCEDURE — 99999PBSHW PR PBB SHADOW TECHNICAL ONLY FILED TO HB: Mod: PBBFAC,,,

## 2025-07-02 PROCEDURE — 73564 X-RAY EXAM KNEE 4 OR MORE: CPT | Mod: 26,50,, | Performed by: STUDENT IN AN ORGANIZED HEALTH CARE EDUCATION/TRAINING PROGRAM

## 2025-07-02 RX ORDER — BETAMETHASONE SODIUM PHOSPHATE AND BETAMETHASONE ACETATE 3; 3 MG/ML; MG/ML
3 INJECTION, SUSPENSION INTRA-ARTICULAR; INTRALESIONAL; INTRAMUSCULAR; SOFT TISSUE
Status: DISCONTINUED | OUTPATIENT
Start: 2025-07-02 | End: 2025-07-02 | Stop reason: HOSPADM

## 2025-07-02 RX ADMIN — BETAMETHASONE ACETATE AND BETAMETHASONE SODIUM PHOSPHATE 3 MG: 3; 3 INJECTION, SUSPENSION INTRA-ARTICULAR; INTRALESIONAL; INTRAMUSCULAR; SOFT TISSUE at 10:07

## 2025-07-02 NOTE — PROCEDURES
Small Joint Aspiration/Injection: R thumb CMC    Date/Time: 7/2/2025 10:20 AM    Performed by: Bentley Ball MD  Authorized by: Bentley Ball MD    Consent Done?:  Yes (Verbal)  Indications:  Arthritis  Site marked: the procedure site was marked    Timeout: prior to procedure the correct patient, procedure, and site was verified    Prep: patient was prepped and draped in usual sterile fashion      Local anesthetic:  Lidocaine 1% without epinephrine  Anesthetic total (ml):  0.5    Location:  Thumb  Site:  R thumb CMC  Needle size:  27 G  Approach:  Dorsal  Medications:  3 mg betamethasone acetate-betamethasone sodium phosphate 6 mg/mL  Patient tolerance:  Patient tolerated the procedure well with no immediate complications    Additional Comments: I have explained the risks, benefits, and alternatives of the procedure in detail.  The patient voices understanding and all questions have been answered.  The patient agrees to proceed as planned. After sterile prep the right  thumb cmc joint was injected while being aware of the relevant neurovascular structures with 3mg celestone and 0.5mL of 1% lidocaine with a 27g needle. The patient tolerated the injection well. The patient understands that immediate relief of pain is secondary to the local anesthetic and will be temporary.  After the anesthetic wears off there may be a increase in pain that may last for a few hours or a few days and they should use ice to help alleviate this flair up of pain.

## 2025-07-02 NOTE — PATIENT INSTRUCTIONS
Encounter Diagnosis   Name Primary?    Primary osteoarthritis of right knee Yes   Status post left ACL reconstruction  Osteoarthritis of the left knee    ASSESSMENT:  Right knee osteoarthritis Kellgren Michael grade 4  Status post ACL repair in 2000 done in [place]    TREATMENT/PLAN:  Long discussion with the patient regarding indications risks and benefits of knee replacement surgery.  Discussed with him that he is a candidate for knee replacement surgery with the expectations of good to excellent function and pain relief.  Risks of surgery were discussed including DVT and wound healing and mechanical failure modes of knee implants.  The patient was concerned regarding metal allergy/sensitivity.  A good friend of his father's had a metal allergy and had to have his knee redone.  He would like to have metal sensitivity testing prior to surgery to be sure he does not have metal allergy.  Discussed polyethylene durability with the patient in detail.  He is considering surgery in around the month of November.  Once he commits to a surgical date we will begin a preoperative total knee protocol, with the plans for Tavarez and Nephew implant with a Oxinium femur.  He has no significant medical comorbidities or elevated risk factors for surgery.

## 2025-07-02 NOTE — PROGRESS NOTES
Dev Hernandez MD  Orthopedic Surgeon   [address] [facility]   [place] [address]                 Name: Augustine Robledo  MRN: [id number]  Date: 7/2/2025    Patient ID: Augustine Robledo is a 59 y.o. male from [place]     Reason for visit:  Bilateral knee pain    Patient Instructions     Encounter Diagnosis   Name Primary?    Primary osteoarthritis of right knee Yes   Status post left ACL reconstruction  Osteoarthritis of the left knee    ASSESSMENT:  Right knee osteoarthritis Kellgren Michael grade 4  Status post ACL repair in 2000 done in [place]    TREATMENT/PLAN:  Long discussion with the patient regarding indications risks and benefits of knee replacement surgery.  Discussed with him that he is a candidate for knee replacement surgery with the expectations of good to excellent function and pain relief.  Risks of surgery were discussed including DVT and wound healing and mechanical failure modes of knee implants.  The patient was concerned regarding metal allergy/sensitivity.  A good friend of his father's had a metal allergy and had to have his knee redone.  He would like to have metal sensitivity testing prior to surgery to be sure he does not have metal allergy.  Discussed polyethylene durability with the patient in detail.  He is considering surgery in around the month of November.  Once he commits to a surgical date we will begin a preoperative total knee protocol, with the plans for Tavarez and Nephew implant with a Oxinium femur.  He has no significant medical comorbidities or elevated risk factors for surgery.    HISTORY OF PRESENT ILLNESS:   Augustine Robledo is a 59 y.o. male.Patient presents today  for evaluation of his right knee.  Current pain 6/10.  Left knee pain rated 3/10.  [facility].  He had an ACL reconstruction done in 2000 and [place].  He is retired .  Currently works as a paint .  Had surgery in high school 30+ years ago for meniscal injury.  Right knee injury occurred in the 10th grade.  Injured his left knee in his senior year but did not have ACL reconstruction surgery until 1999.    He is here for years in the [employer].    He works out regularly and does cardio in his knee has been feeling unstable with a any single stance weight-bearing on the right.    Right knee pain is 6/10 on average sometimes spikes to 8 to 9/10 with activity.  Left knee pain manageable at 3/10.    He has used anti-inflammatories ice and heat bracing.  He has done regular exercise at the gym with a quadriceps hamstring and calf strengthening and cardio.    No significant medical comorbidities.  Objective     XRAY:  Done at [facility] today    Kellgren Michael grade 4 right knee  Right knee varus bone-on-bone lateral tibial subluxation large osteophytes significant sclerosis.  Left knee has retained metallic screws x2 tibia and femur.  Narrowing of both the medial and lateral joint line with marginal osteophytes.  No significant varus or valgus malalignment.  Left knee Kellgren Michael grade 3 changes  PHYSICAL EXAMINATION: Body mass index is 32.05 kg/m².    GENERAL:  Well dressed well groomed 59-year-old white male    EXTREMITY:  Right knee range of motion is 5-110 degrees with a significant varus deformity of about 10+ degrees.  Slight lateral tibial subluxation.  Pain and popping of the joint with flexion-extension active and passive.  Transverse medial incision from previous meniscectomy.  Patella tracked centrally.  Collateral ligaments are stable.  ACL PCL stable.  Calf soft.  Neurovascularly intact right lower extremity normal pulses and capillary refill.    Left knee has well-healed incisions from the ACL reconstruction.  The patient has a stable anterior drawer negative posterior  drawer normal patellar tracking no significant effusion.  Collateral ligaments are stable.  Calf soft.  Neurovascularly intact left lower extremity.  No peripheral edema.         MEDICATIONS: Current Medications[1]    ALLERGIES: Review of patient's allergies indicates:  No Known Allergies    MEDICAL HISTORY: History reviewed. No pertinent past medical history.    SURGICAL HISTORY:   Past Surgical History:   Procedure Laterality Date    APPENDECTOMY      COLONOSCOPY N/A 01/22/2021    Procedure: COLONOSCOPY;  Surgeon: Kari Rivera MD;  Location: [facility];  Service: Endoscopy;  Laterality: N/A;    EXCISIONAL HEMORRHOIDECTOMY N/A 02/16/2023    Procedure: HEMORRHOIDECTOMY;  Surgeon: Mansi Kapoor DO;  Location: [facility] OR;  Service: General;  Laterality: N/A;    INJECTION OF ANESTHETIC AGENT AROUND PUDENDAL NERVE N/A 02/16/2023    Procedure: BLOCK, NERVE, PUDENDAL;  Surgeon: Mansi Kapoor DO;  Location: [facility] OR;  Service: General;  Laterality: N/A;    KNEE ARTHROSCOPY W/ ACL RECONSTRUCTION Left     THUMB ARTHROSCOPY         REVIEW OF SYSTEMS:  Negative for fevers, chills sweats or shortness of breath.  No chest pain.  The patient is experiencing difficulty with balance, sweats, joint pain, neck pain and shoulder pain    SOCIAL HISTORY:  Lives with spouse.  No stairs at home.  No smoking.  Uses alcohol.  Works full duty.  Social History     Occupational History    Not on file   Tobacco Use    Smoking status: Never    Smokeless tobacco: Never   Substance and Sexual Activity    Alcohol use: Not on file    Drug use: Never    Sexual activity: Not on file       Patient Type: New Patient  Visit Type: (CPT 57227 - New 60-74 min)     Sixty minute patient encounter  This includes face to face time and non-face to face time preparing to see the patient (eg, review of tests),   obtaining and/or reviewing separately obtained history, documenting clinical information in the electronic or other health record,  independently interpreting results   and communicating results to the patient/family/caregiver, or care coordinator.       DISCLAIMER: This note was prepared with [employer]oom[employer] voice recognition transcription software. Garbled syntax, mangled pronouns, and other bizarre constructions may be attributed to that software system.      Dev Hernandez M.D.  Orthopedic Surgeon  [facility]           [1]   Current Outpatient Medications:     multivitamin capsule, Take 1 capsule by mouth once daily., Disp: , Rfl:     sildenafiL (VIAGRA) 100 MG tablet, Take 100 mg by mouth., Disp: , Rfl:     traZODone (DESYREL) 100 MG tablet, Take 200 mg by mouth every evening., Disp: , Rfl:     azelastine (ASTELIN) 137 mcg (0.1 %) nasal spray, by Nasal route., Disp: , Rfl:     fluticasone propionate (FLONASE) 50 mcg/actuation nasal spray, by Nasal route., Disp: , Rfl:     tretinoin (RETIN-A) 0.025 % cream, Apply topically., Disp: , Rfl:

## 2025-07-02 NOTE — ASSESSMENT & PLAN NOTE
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   Symptoms may resolve, worsen and even become permanent.  The patient understands the treatment options including observation, activity modification, therapy, NSAIDs, splints, injections and the surgical options including thumb CMC arthroplasty.  We discussed the risks of the diagnosis and the treatment options including pain, infection, bleeding, damage to nerves and vessels, stiffness, scarring, incomplete relief or recurrence of symptoms, poor pain and functional outcomes.  Unique risks of this diagnosis and the treatment include persistent pain and deformity.  The patients treatment is further complicated by chronicity of his arthritis.  The patient has elected to proceed with right thumb CMC injection  patient may call to schedule right thumb CMC  arthroplasty at the end of the  year we will follow up as needed.

## 2025-07-02 NOTE — PROGRESS NOTES
ZULEYKA Ball M.D.  Orthopaedic Hand and Wrist Surgery  [facility]    Patient ID: Augustine Robledo  YOB: 1965  MRN: [id number]    Provider Note/Medical Decision Makin. Arthritis of carpometacarpal (CMC) joint of right thumb  Assessment & Plan:  The patient and I talked at length about the natural history and pathophysiology of right thumb CMC arthritis, he understands that this is a chronic problem which may have acute episodic exacerbations.   Symptoms may resolve, worsen and even become permanent.  The patient understands the treatment options including observation, activity modification, therapy, NSAIDs, splints, injections and the surgical options including thumb CMC arthroplasty.  We discussed the risks of the diagnosis and the treatment options including pain, infection, bleeding, damage to nerves and vessels, stiffness, scarring, incomplete relief or recurrence of symptoms, poor pain and functional outcomes.  Unique risks of this diagnosis and the treatment include persistent pain and deformity.  The patients treatment is further complicated by chronicity of his arthritis.  The patient has elected to proceed with right thumb CMC injection  patient may call to schedule right thumb CMC  arthroplasty at the end of the  year we will follow up as needed.      Orders:  -     Small Joint Aspiration/Injection: R thumb CMC         Chief Complaint:  right thumb basal joint pain with known CMC arthritis    Referred By: Administration,[facility]    History of Present Illness: Augustine Robledo is a 59 y.o. male presents today for follow up of right thumb CMC arthritis. He is planning to have a knee replacement in the near future and is considering CMC arthroplasty after he heals from his knee surgery. He states he is here today for another injection.      Recall HPI from 3/27/25:  Augustine Robledo is a 59 y.o. male with chronic right thumb CMC arthritis injection seemed to give him  "4-5 months of relief he is here today requesting another injection     Patient was queried and this is the extent of the patients current complaints today.    Past Medical History:     Estimated body mass index is 32.05 kg/m² as calculated from the following:    Height as of an earlier encounter on 7/2/25: 5' 5.5" (1.664 m).    Weight as of an earlier encounter on 7/2/25: 88.7 kg (195 lb 8.8 oz).  History reviewed. No pertinent past medical history.    Past Surgical History:   Procedure Laterality Date    APPENDECTOMY      COLONOSCOPY N/A 01/22/2021    Procedure: COLONOSCOPY;  Surgeon: Kari Rivera MD;  Location: [facility];  Service: Endoscopy;  Laterality: N/A;    EXCISIONAL HEMORRHOIDECTOMY N/A 02/16/2023    Procedure: HEMORRHOIDECTOMY;  Surgeon: Mansi Kapoor DO;  Location: [facility] OR;  Service: General;  Laterality: N/A;    INJECTION OF ANESTHETIC AGENT AROUND PUDENDAL NERVE N/A 02/16/2023    Procedure: BLOCK, NERVE, PUDENDAL;  Surgeon: Mansi Kapoor DO;  Location: [facility] OR;  Service: General;  Laterality: N/A;    KNEE ARTHROSCOPY W/ ACL RECONSTRUCTION Left     THUMB ARTHROSCOPY       Family History   Problem Relation Name Age of Onset    Heart disease Mother      Hyperlipidemia Mother      Diabetes Paternal Grandmother       Social History[1]  Medication List with Changes/Refills   Current Medications    AZELASTINE (ASTELIN) 137 MCG (0.1 %) NASAL SPRAY    by Nasal route.    FLUTICASONE PROPIONATE (FLONASE) 50 MCG/ACTUATION NASAL SPRAY    by Nasal route.    MULTIVITAMIN CAPSULE    Take 1 capsule by mouth once daily.    SILDENAFIL (VIAGRA) 100 MG TABLET    Take 100 mg by mouth.    TRAZODONE (DESYREL) 100 MG TABLET    Take 200 mg by mouth every evening.    TRETINOIN (RETIN-A) 0.025 % CREAM    Apply topically.     Review of patient's allergies indicates:  No Known Allergies  ROS    Physical Exam:   GENERAL: Well appearing, appropriate for stated age, no acute distress.  CARDIOVASCULAR:  Fingers have " good brisk refill and good turgor.   PULMONARY: Respirations are even and non-labored.  NEURO: Awake, alert, and oriented x 3.  PSYCH: Mood & affect are appropriate.  Ortho/SPM Exam  Hand/Wrist Musculoskeletal Exam  Right hand   Skin:  No skin changes  Positive right thumb CMC grind  Negative Finkelstein's  Tenderness of the thumb CMC joint  No tenderness over the wrist STT joint       Imaging:    Relevant imaging results reviewed and interpreted by me, discussed with the patient and / or family today.   No new x-rays today        Provider Note/Medical Decision Makin. Arthritis of carpometacarpal (CMC) joint of right thumb  Assessment & Plan:  The patient and I talked at length about the natural history and pathophysiology of right thumb CMC arthritis, he understands that this is a chronic problem which may have acute episodic exacerbations.   Symptoms may resolve, worsen and even become permanent.  The patient understands the treatment options including observation, activity modification, therapy, NSAIDs, splints, injections and the surgical options including thumb CMC arthroplasty.  We discussed the risks of the diagnosis and the treatment options including pain, infection, bleeding, damage to nerves and vessels, stiffness, scarring, incomplete relief or recurrence of symptoms, poor pain and functional outcomes.  Unique risks of this diagnosis and the treatment include persistent pain and deformity.  The patients treatment is further complicated by chronicity of his arthritis.  The patient has elected to proceed with right thumb CMC injection  patient may call to schedule right thumb CMC  arthroplasty at the end of the  year we will follow up as needed.      Orders:  -     Small Joint Aspiration/Injection: R thumb CMC         I discussed worrisome and red flag signs and symptoms with the patient. The patient expressed understanding and agreed to alert me immediately or to go to the emergency room if they  experience any of these.   Treatment plan was developed with input from the patient/family, and they expressed understanding and agreement with the plan. All questions were answered today.    There are no Patient Instructions on file for this visit.    ZULEYKA Ball M.D.  [facility]  Orthopedic Hand and Wrist Surgeon    [place] [facility]  Dr. Jonathan Ball   [employer]   [employer]     Disclaimer: This note was prepared using a voice recognition system and is likely to have sound alike errors within the text.          [1]   Social History  Socioeconomic History    Marital status:    Tobacco Use    Smoking status: Never    Smokeless tobacco: Never   Substance and Sexual Activity    Drug use: Never

## 2025-07-03 ENCOUNTER — TELEPHONE (OUTPATIENT)
Dept: ORTHOPEDICS | Facility: CLINIC | Age: 60
End: 2025-07-03
Payer: OTHER GOVERNMENT

## 2025-07-03 ENCOUNTER — E-CONSULT (OUTPATIENT)
Dept: ALLERGY | Facility: CLINIC | Age: 60
End: 2025-07-03
Payer: OTHER GOVERNMENT

## 2025-07-03 DIAGNOSIS — M17.11 PRIMARY OSTEOARTHRITIS OF RIGHT KNEE: Primary | ICD-10-CM

## 2025-07-03 DIAGNOSIS — Z01.818 PRE-OP EVALUATION: ICD-10-CM

## 2025-07-03 DIAGNOSIS — R45.89 ANXIETY ABOUT HEALTH: Primary | ICD-10-CM

## 2025-07-03 NOTE — CONSULTS
[facility] - Allergy/Immunology  Response for E-Consult     Patient Name: Augustine Robledo  MRN: [id number]  Primary Care Provider: No, Primary Doctor   Requesting Provider: Dev Hernandez MD  Consults    Recommendation:   1) Recommend against allergy testing without a clinical history of reactions  2) If still desired, refer to dermatology in consideration of patch testing    Total time of Consultation: 10 minute    I did not speak to the requesting provider verbally about this.     In a patient without history suggestive of metal allergy (most commonly delayed type hypersensitivity reactions on skin in contact with nickel) positive test results may be false positives. In one analysis, only 55.5% of positive nickel allergy patch tests were thought to be clinically relevant. Therefore, pre-operative patch testing in a patient without history of reactions is not routine practice.    However we understand that knowing someone with a poor outcome can make patients apprehensive. If the patient is not reassured, then please refer to Dermatology for consideration of patch testing. Unfortunately we only perform immediate-type hypersensitivity testing in our allergy clinic, and dermatology assesses for delayed type hypersensitivity using patch tests.     After an initial visit to the dermatologist, a date is set to apply the patches (typically on a Monday) with no bathing until it is removed 48 hours later (typically on a Wednesday). The patient then returns to the dermatologist on a Thursday and/or Friday for interpretation of the patch test results. For some patients, knowing how cumbersome the patch testing is, they will elect to avoid the test when it is not clearly indicated.     Thank you for this eConsult referral.     MD [facility] Short - Allergy/Immunology  *This eConsult is based on the clinical data available to me and is furnished without benefit of a physical examination. The eConsult will need to be  interpreted in light of any clinical issues or changes in patient status not available to me at the time of filing this eConsults. Significant changes in patient condition or level of acuity should result in immediate formal consultation and reevaluation. Please alert me if you have further questions.

## 2025-07-03 NOTE — TELEPHONE ENCOUNTER
Spoke with patients wife, Madelaine, regarding surgery and all appointments needed for surgery. Went over appointment dates, times, and locations. Notified wife of econsult sent to Allergy for metal allergy/sensitivity test. Emphasized the importance of calling me and letting me know if any appointments needed to be rescheduled or were missed for any reason. Patient wife encouraged to ask questions and verbalized understanding.    Dasha Christianson RN Navigator  [facility]

## 2025-07-03 NOTE — TELEPHONE ENCOUNTER
Called patients wife with econsult recommendation from Allergy department. Per Allergy, does not recommend testing (see econsult) however, if patient desires, they need to see Dermatology. Patient wife would like to proceed with testing and referral was sent to Dermatology. Patient wife, Madelaine, verbalized understanding.    Dasha Christianson  RN Navigator  [facility]

## 2025-07-08 ENCOUNTER — DOCUMENTATION ONLY (OUTPATIENT)
Dept: ORTHOPEDICS | Facility: CLINIC | Age: 60
End: 2025-07-08
Payer: OTHER GOVERNMENT

## 2025-07-08 ENCOUNTER — TELEPHONE (OUTPATIENT)
Dept: DERMATOLOGY | Facility: CLINIC | Age: 60
End: 2025-07-08
Payer: OTHER GOVERNMENT

## 2025-07-08 NOTE — TELEPHONE ENCOUNTER
Return call made to   Copied from CRM #[id number]. Topic: General Inquiry - Return Call  >> Jul 8, 2025  3:20 PM Miley wrote:  Type:  Patient Returning Call    Who Called:Augustine Robledo  Who Left Message for Patient: Radha  Does the patient know what this is regarding?:scheduling  Would the patient rather a call back or a response via [facility]? Call back  Best Call Back Number:[phone]  Additional Information: n/a

## 2025-07-24 ENCOUNTER — HOSPITAL ENCOUNTER (OUTPATIENT)
Dept: RADIOLOGY | Facility: HOSPITAL | Age: 60
Discharge: HOME | End: 2025-07-24
Attending: ORTHOPAEDIC SURGERY
Payer: OTHER GOVERNMENT

## 2025-07-24 DIAGNOSIS — M17.12 PRIMARY OSTEOARTHRITIS OF LEFT KNEE: ICD-10-CM

## 2025-07-24 DIAGNOSIS — M17.11 PRIMARY OSTEOARTHRITIS OF RIGHT KNEE: ICD-10-CM

## 2025-07-24 PROCEDURE — 73721 MRI JNT OF LWR EXTRE W/O DYE: CPT | Mod: 26,RT,, | Performed by: RADIOLOGY

## 2025-07-24 PROCEDURE — 73721 MRI JNT OF LWR EXTRE W/O DYE: CPT | Mod: TC,RT

## 2025-08-04 ENCOUNTER — PATIENT MESSAGE (OUTPATIENT)
Dept: ORTHOPEDICS | Facility: CLINIC | Age: 60
End: 2025-08-04
Payer: OTHER GOVERNMENT

## 2025-08-05 ENCOUNTER — HOSPITAL ENCOUNTER (OUTPATIENT)
Dept: RADIOLOGY | Facility: HOSPITAL | Age: 60
Discharge: HOME | End: 2025-08-05
Attending: ORTHOPAEDIC SURGERY
Payer: OTHER GOVERNMENT

## 2025-08-05 DIAGNOSIS — M17.11 PRIMARY OSTEOARTHRITIS OF RIGHT KNEE: ICD-10-CM

## 2025-08-05 DIAGNOSIS — M17.12 PRIMARY OSTEOARTHRITIS OF LEFT KNEE: ICD-10-CM

## 2025-08-05 PROCEDURE — 77073 BONE LENGTH STUDIES: CPT | Mod: TC,PN

## 2025-08-05 PROCEDURE — 71045 X-RAY EXAM CHEST 1 VIEW: CPT | Mod: TC,PN

## 2025-08-05 PROCEDURE — 71045 X-RAY EXAM CHEST 1 VIEW: CPT | Mod: 26,,, | Performed by: RADIOLOGY

## 2025-08-05 PROCEDURE — 77073 BONE LENGTH STUDIES: CPT | Mod: 26,,, | Performed by: RADIOLOGY

## 2025-08-06 DIAGNOSIS — M18.11 ARTHRITIS OF CARPOMETACARPAL (CMC) JOINT OF RIGHT THUMB: Primary | ICD-10-CM

## 2025-08-08 ENCOUNTER — PATIENT MESSAGE (OUTPATIENT)
Dept: ORTHOPEDICS | Facility: CLINIC | Age: 60
End: 2025-08-08
Payer: OTHER GOVERNMENT

## 2025-08-08 DIAGNOSIS — M17.11 PRIMARY OSTEOARTHRITIS OF RIGHT KNEE: ICD-10-CM

## 2025-08-08 DIAGNOSIS — M17.12 PRIMARY OSTEOARTHRITIS OF LEFT KNEE: Primary | ICD-10-CM

## 2025-08-12 RX ORDER — CELECOXIB 200 MG/1
200 CAPSULE ORAL 2 TIMES DAILY
Qty: 60 CAPSULE | Refills: 1 | Status: SHIPPED | OUTPATIENT
Start: 2025-08-12

## 2025-08-21 ENCOUNTER — HOSPITAL ENCOUNTER (OUTPATIENT)
Dept: RADIOLOGY | Facility: HOSPITAL | Age: 60
Discharge: HOME | End: 2025-08-21
Payer: OTHER GOVERNMENT

## 2025-08-21 ENCOUNTER — OFFICE VISIT (OUTPATIENT)
Dept: ORTHOPEDICS | Facility: CLINIC | Age: 60
End: 2025-08-21
Payer: OTHER GOVERNMENT

## 2025-08-21 DIAGNOSIS — M79.641 RIGHT HAND PAIN: ICD-10-CM

## 2025-08-21 DIAGNOSIS — M79.641 RIGHT HAND PAIN: Primary | ICD-10-CM

## 2025-08-21 DIAGNOSIS — M18.11 ARTHRITIS OF CARPOMETACARPAL (CMC) JOINT OF RIGHT THUMB: ICD-10-CM

## 2025-08-21 PROCEDURE — 99999 PR PBB SHADOW E&M-EST. PATIENT-LVL III: CPT | Mod: PBBFAC,,, | Performed by: ORTHOPAEDIC SURGERY

## 2025-08-21 PROCEDURE — 99999PBSHW PR PBB SHADOW TECHNICAL ONLY FILED TO HB: Mod: PBBFAC,,,

## 2025-08-21 PROCEDURE — 73130 X-RAY EXAM OF HAND: CPT | Mod: TC,RT

## 2025-08-21 PROCEDURE — 73130 X-RAY EXAM OF HAND: CPT | Mod: 26,RT,, | Performed by: STUDENT IN AN ORGANIZED HEALTH CARE EDUCATION/TRAINING PROGRAM

## 2025-08-21 PROCEDURE — 99213 OFFICE O/P EST LOW 20 MIN: CPT | Mod: PBBFAC,25 | Performed by: ORTHOPAEDIC SURGERY

## 2025-08-21 PROCEDURE — 20600 DRAIN/INJ JOINT/BURSA W/O US: CPT | Mod: PBBFAC,RT | Performed by: ORTHOPAEDIC SURGERY

## 2025-08-21 RX ORDER — BETAMETHASONE SODIUM PHOSPHATE AND BETAMETHASONE ACETATE 3; 3 MG/ML; MG/ML
3 INJECTION, SUSPENSION INTRA-ARTICULAR; INTRALESIONAL; INTRAMUSCULAR; SOFT TISSUE
Status: DISCONTINUED | OUTPATIENT
Start: 2025-08-21 | End: 2025-08-21 | Stop reason: HOSPADM

## 2025-08-21 RX ADMIN — BETAMETHASONE ACETATE AND BETAMETHASONE SODIUM PHOSPHATE 3 MG: 3; 3 INJECTION, SUSPENSION INTRA-ARTICULAR; INTRALESIONAL; INTRAMUSCULAR; SOFT TISSUE at 03:08

## (undated) DEVICE — JELLY SURGILUBE LUBE PKT 3GM

## (undated) DEVICE — TUBING MEDI-VAC 20FT .25IN

## (undated) DEVICE — DECANTER 6 VIAL

## (undated) DEVICE — BLADE SURG #15 CARBON STEEL

## (undated) DEVICE — MANIFOLD 4 PORT

## (undated) DEVICE — SUT CHROMIC 3-0 SH 27IN GUT

## (undated) DEVICE — GOWN POLY REINF BRTH SLV XL

## (undated) DEVICE — NDL SAFETY 22G X 1.5 ECLIPSE

## (undated) DEVICE — SYR 10CC LUER LOCK

## (undated) DEVICE — UNDERGLOVE BIOGEL PI SZ 6.5 LF

## (undated) DEVICE — SUT CTD VICRYL VIL BR UR-6

## (undated) DEVICE — GAUZE PETROLATUM VASLNE 3X36IN

## (undated) DEVICE — GAUZE SPONGE 4X4 12PLY

## (undated) DEVICE — ELECTRODE REM PLYHSV RETURN 9

## (undated) DEVICE — PANTIES FEMININE NAPKIN LG/XLG

## (undated) DEVICE — PACK BASIC SETUP SC BR

## (undated) DEVICE — TAPE SILK 3IN

## (undated) DEVICE — SUT PROLENE 2-0 SH 36IN BLU

## (undated) DEVICE — TOWEL OR DISP STRL BLUE 4/PK

## (undated) DEVICE — DRAPE LAP T SHT W/ INSTR PAD

## (undated) DEVICE — GLOVE SURGICAL LATEX SZ 6.5

## (undated) DEVICE — HEMOSTAT SURGICEL 4X8IN

## (undated) DEVICE — COVER LIGHT HANDLE 80/CA

## (undated) DEVICE — CONNECTOR TUBING STR 5 IN 1

## (undated) DEVICE — DISSECTOR LIGASURE EXACT 21CM

## (undated) DEVICE — DRESSING GAUZE PETRO 3X9

## (undated) DEVICE — BLADE 4IN EDGE INSULATED

## (undated) DEVICE — SUT CTD VICRYL 2-0 UND BR

## (undated) DEVICE — PAD ABD 8X10 STERILE

## (undated) DEVICE — CONTAINER SPECIMEN OR STER 4OZ